# Patient Record
Sex: FEMALE | Race: WHITE | NOT HISPANIC OR LATINO | Employment: FULL TIME | URBAN - METROPOLITAN AREA
[De-identification: names, ages, dates, MRNs, and addresses within clinical notes are randomized per-mention and may not be internally consistent; named-entity substitution may affect disease eponyms.]

---

## 2018-07-03 ENCOUNTER — OFFICE VISIT (OUTPATIENT)
Dept: FAMILY MEDICINE | Facility: CLINIC | Age: 33
End: 2018-07-03
Payer: COMMERCIAL

## 2018-07-03 VITALS
WEIGHT: 150.2 LBS | SYSTOLIC BLOOD PRESSURE: 116 MMHG | HEART RATE: 68 BPM | DIASTOLIC BLOOD PRESSURE: 68 MMHG | RESPIRATION RATE: 16 BRPM | TEMPERATURE: 98.3 F

## 2018-07-03 DIAGNOSIS — R07.0 THROAT PAIN: Primary | ICD-10-CM

## 2018-07-03 DIAGNOSIS — J03.90 TONSILLITIS: ICD-10-CM

## 2018-07-03 LAB
DEPRECATED S PYO AG THROAT QL EIA: NORMAL
SPECIMEN SOURCE: NORMAL

## 2018-07-03 PROCEDURE — 99203 OFFICE O/P NEW LOW 30 MIN: CPT | Performed by: NURSE PRACTITIONER

## 2018-07-03 PROCEDURE — 87880 STREP A ASSAY W/OPTIC: CPT | Performed by: NURSE PRACTITIONER

## 2018-07-03 PROCEDURE — 87081 CULTURE SCREEN ONLY: CPT | Performed by: NURSE PRACTITIONER

## 2018-07-03 RX ORDER — AMOXICILLIN 500 MG/1
500 CAPSULE ORAL 2 TIMES DAILY
Qty: 20 CAPSULE | Refills: 0 | Status: SHIPPED | OUTPATIENT
Start: 2018-07-03 | End: 2018-07-13

## 2018-07-03 NOTE — PATIENT INSTRUCTIONS
Take antibiotic as directed.    Increase rest and fluids. Tylenol and/or Ibuprofen for comfort. Cool mist vaporizer. If your symptoms worsen or do not resolve follow up with your primary care provider in 1 week and sooner if needed.        Indications for emergent return to emergency department discussed with patient, who verbalized good understanding and agreement.  Patient understands the limitations of today's evaluation.           Tonsillitis (Child)    Tonsillitis is an inflammation or infection of your child's tonsils. Your child has 2 tonsils, 1 on either side of his or her throat. The tonsils are large, oval glands. They help prevent infections. But tonsils can become infected themselves. Tonsillitis is a common childhood condition.  Tonsillitis can be caused by bacteria or a virus. The main symptom is a sore throat. Your child may also have a fever, throat redness or swelling, or trouble swallowing. The tonsils may also look white, gray, or yellow.  If your child has a bacterial infection, antibiotics may be prescribed. Antibiotics don t work against viral infections. In some cases of a viral infection, an antiviral medicine may be prescribed. Once the problem has been treated, your child may need surgery to remove the tonsils if they become infected often or cause breathing problems.  Home care  If your child s healthcare provider has prescribed antibiotics or another medicine, give it to your child as directed. Be sure your child finishes all of the medicine, even if he or she feels better.  To help ease your child s sore throat:    Give acetaminophen or ibuprofen. Follow the package instructions for giving these to a child. (Do not give aspirin to anyone younger than 18 years old who is ill with a fever. It may cause severe liver damage.)    Offer cool liquids to drink.    Have your child gargle with warm salt water. An over-the-counter throat-numbing spray may also help.  The germs that cause  tonsillitis are very contagious. To help prevent their spread, follow these tips:    Teach your child to wash his or her hands often.    Don t let your child share cups or utensils with other people.    Keep your child away from other children until he or she is better.  Follow-up care  Follow up with your child's healthcare provider, or as advised.  When to seek medical advice  Unless advised otherwise, call your child's healthcare provider if:    Your child is 3 months old or younger and has a fever of 100.4 F (38 C) or higher. Your child may need to see a healthcare provider.    Your child is of any age and has fevers higher than 104 F (40 C) that come back again and again.  Also call if any of the following occur:    Child has a sore throat for more than 2 days    Child has a sore throat with fever, headache, stomachache, or rash    Child has neck pain    Child has a seizure    Child is acting strangely    Child has trouble swallowing or breathing    Child can t open his or her mouth fully  Date Last Reviewed: 10/1/2017    4619-7965 [employer]. [address] [place] [address]. All rights reserved. This information is not intended as a substitute for professional medical care. Always follow your healthcare professional's instructions.        Self-Care for Sore Throats    Sore throats happen for many reasons, such as colds, allergies, and infections caused by viruses or bacteria. In any case, your throat becomes red and sore. Your goal for self-care is to reduce your discomfort while giving your throat a chance to heal.  Moisten and soothe your throat  Tips include the following:    Try a sip of water first thing after waking up.    Keep your throat moist by drinking 6 or more glasses of clear liquids every day.    Run a cool-air humidifier in your room overnight.    Avoid cigarette smoke.     Suck on throat lozenges, cough drops, hard candy, ice chips, or frozen fruit-juice bars. Use the  sugar-free versions if your diet or medical condition requires them.  Gargle to ease irritation  Gargling every hour or 2 can ease irritation. Try gargling with 1 of these solutions:    1/4 teaspoon of salt in 1/2 cup of warm water    An over-the-counter anesthetic gargle  Use medicine for more relief  Over-the-counter medicine can reduce sore throat symptoms. Ask your pharmacist if you have questions about which medicine to use:    Ease pain with anesthetic sprays. Aspirin or an aspirin substitute also helps. Remember, never give aspirin to anyone 18 or younger, or if you are already taking blood thinners.     For sore throats caused by allergies, try antihistamines to block the allergic reaction.    Remember: unless a sore throat is caused by a bacterial infection, antibiotics won t help you.  Prevent future sore throats  Prevention tips include the following:    Stop smoking or reduce contact with secondhand smoke. Smoke irritates the tender throat lining.    Limit contact with pets and with allergy-causing substances, such as pollen and mold.    When you re around someone with a sore throat or cold, wash your hands often to keep viruses or bacteria from spreading.    Don t strain your vocal cords.  Call your healthcare provider  Contact your healthcare provider if you have:    A temperature over 101 F (38.3 C)    White spots on the throat    Great difficulty swallowing    Trouble breathing    A skin rash    Recent exposure to someone else with strep bacteria    Severe hoarseness and swollen glands in the neck or jaw   Date Last Reviewed: 8/1/2016 2000-2017 [employer]. [address] [place] [address], [place] [address]. All rights reserved. This information is not intended as a substitute for professional medical care. Always follow your healthcare professional's instructions.

## 2018-07-03 NOTE — NURSING NOTE
Chief Complaint   Patient presents with     Pharyngitis       Initial /68 (BP Location: Right arm, Cuff Size: Adult Regular)  Pulse 68  Temp 98.3  F (36.8  C) (Tympanic)  Resp 16  Wt 150 lb 3.2 oz (68.1 kg) There is no height or weight on file to calculate BMI.      Health Maintenance that is potentially due pending provider review:  NONE    n/a    Is there anyone who you would like to be able to receive your results? Not Applicable  If yes have patient fill out LEANDRO  Inga Smith M.A.

## 2018-07-03 NOTE — LETTER
July 5, 2018      Omayra Cadena  [address] [facility] [address]        Dear Omayra,         This letter is to inform you that the results of your recent throat culture are negative.  If you have any questions please call or make an appointment.          Sincerely,        ANIBAL Ayala CNP

## 2018-07-03 NOTE — PROGRESS NOTES
SUBJECTIVE:   Omayra Cadena is a 33 year old female who presents to clinic today for the following health issues:    ENT Symptoms             Symptoms: cc Present Absent Comment   Fever/Chills       Fatigue       Muscle Aches       Eye Irritation       Sneezing       Nasal Ethan/Drg       Sinus Pressure/Pain       Loss of smell       Dental pain       Sore Throat  x     Swollen Glands  x  Cold makes it burn. Hx of tonsil stones    Ear Pain/Fullness       Cough       Wheeze       Chest Pain       Shortness of breath       Rash       Other         Symptom duration:  Friday    Symptom severity:  worse since Sunday    Treatments tried:  cough drops no help   Contacts:   None              Problem list and histories reviewed & adjusted, as indicated.  Additional history: as documented    There is no problem list on file for this patient.    History reviewed. No pertinent surgical history.    Social History   Substance Use Topics     Smoking status: Never Smoker     Smokeless tobacco: Never Used     Alcohol use Not on file     History reviewed. No pertinent family history.      Current Outpatient Prescriptions   Medication Sig Dispense Refill     amoxicillin (AMOXIL) 500 MG capsule Take 1 capsule (500 mg) by mouth 2 times daily for 10 days 20 capsule 0     Not on File  Labs reviewed in EPIC    Reviewed and updated as needed this visit by clinical staff  Tobacco  Allergies  Meds  Problems  Med Hx  Surg Hx  Fam Hx  Soc Hx        Reviewed and updated as needed this visit by Provider  Allergies  Meds  Problems         ROS:  Constitutional, HEENT, cardiovascular, pulmonary, GI, , musculoskeletal, neuro, skin, endocrine and psych systems are negative, except as otherwise noted.    OBJECTIVE:     /68 (BP Location: Right arm, Cuff Size: Adult Regular)  Pulse 68  Temp 98.3  F (36.8  C) (Tympanic)  Resp 16  Wt 150 lb 3.2 oz (68.1 kg)  There is no height or weight on file to calculate BMI.   GENERAL:  healthy, alert and no distress, nontoxic in appearance  EYES: Eyes grossly normal to inspection, PERRL and conjunctivae and sclerae normal  HENT: ear canals and TM's normal, nose and mouth without ulcers or lesions, posterior oropharynx red on both sides without exudate but her pain is more on right side.  NECK: no adenopathy, supple with full ROM  RESP: lungs clear to auscultation - no rales, rhonchi or wheezes  CV: regular rate and rhythm, normal S1 S2, no S3 or S4, no murmur, click or rub, no peripheral edema   ABDOMEN: soft, nontender, no hepatosplenomegaly, no masses and bowel sounds normal  MS: no gross musculoskeletal defects noted, no edema  No rash    Diagnostic Test Results:  Results for orders placed or performed in visit on 07/03/18 (from the past 24 hour(s))   Strep, Rapid Screen   Result Value Ref Range    Specimen Description Throat     Rapid Strep A Screen       NEGATIVE: No Group A streptococcal antigen detected by immunoassay, await culture report.       ASSESSMENT/PLAN:   Pt states she feels like a knife is stabbing her in the right tonsil. She has had stones in the past. Will try emperic treatment with amoxicillin and if no response or worse she needs to follow up with PCP or go to the ER.  Problem List Items Addressed This Visit     None      Visit Diagnoses     Throat pain    -  Primary    Relevant Orders    Strep, Rapid Screen (Completed)    Beta strep group A culture (Completed)    Tonsillitis        Relevant Medications    amoxicillin (AMOXIL) 500 MG capsule         Only tonsillitis instructions are for child. Pt told prior to discharge.      Patient Instructions   Take antibiotic as directed.    Increase rest and fluids. Tylenol and/or Ibuprofen for comfort. Cool mist vaporizer. If your symptoms worsen or do not resolve follow up with your primary care provider in 1 week and sooner if needed.        Indications for emergent return to emergency department discussed with patient, who  verbalized good understanding and agreement.  Patient understands the limitations of today's evaluation.           Tonsillitis (Child)    Tonsillitis is an inflammation or infection of your child's tonsils. Your child has 2 tonsils, 1 on either side of his or her throat. The tonsils are large, oval glands. They help prevent infections. But tonsils can become infected themselves. Tonsillitis is a common childhood condition.  Tonsillitis can be caused by bacteria or a virus. The main symptom is a sore throat. Your child may also have a fever, throat redness or swelling, or trouble swallowing. The tonsils may also look white, gray, or yellow.  If your child has a bacterial infection, antibiotics may be prescribed. Antibiotics don t work against viral infections. In some cases of a viral infection, an antiviral medicine may be prescribed. Once the problem has been treated, your child may need surgery to remove the tonsils if they become infected often or cause breathing problems.  Home care  If your child s healthcare provider has prescribed antibiotics or another medicine, give it to your child as directed. Be sure your child finishes all of the medicine, even if he or she feels better.  To help ease your child s sore throat:    Give acetaminophen or ibuprofen. Follow the package instructions for giving these to a child. (Do not give aspirin to anyone younger than 18 years old who is ill with a fever. It may cause severe liver damage.)    Offer cool liquids to drink.    Have your child gargle with warm salt water. An over-the-counter throat-numbing spray may also help.  The germs that cause tonsillitis are very contagious. To help prevent their spread, follow these tips:    Teach your child to wash his or her hands often.    Don t let your child share cups or utensils with other people.    Keep your child away from other children until he or she is better.  Follow-up care  Follow up with your child's healthcare  provider, or as advised.  When to seek medical advice  Unless advised otherwise, call your child's healthcare provider if:    Your child is 3 months old or younger and has a fever of 100.4 F (38 C) or higher. Your child may need to see a healthcare provider.    Your child is of any age and has fevers higher than 104 F (40 C) that come back again and again.  Also call if any of the following occur:    Child has a sore throat for more than 2 days    Child has a sore throat with fever, headache, stomachache, or rash    Child has neck pain    Child has a seizure    Child is acting strangely    Child has trouble swallowing or breathing    Child can t open his or her mouth fully  Date Last Reviewed: 10/1/2017    9280-3595 [employer]. [address] [place] [address], [place] [address]. All rights reserved. This information is not intended as a substitute for professional medical care. Always follow your healthcare professional's instructions.            FLNB SAME DAY PROVIDER  [facility]

## 2018-07-03 NOTE — MR AVS SNAPSHOT
After Visit Summary   7/3/2018    Omayra Cadena    MRN: [id number]           Patient Information     Date Of Birth          1985        Visit Information        Provider Department      7/3/2018 1:00 PM Anne-Marie Sadler APRN [facility]        Today's Diagnoses     Throat pain    -  1    Tonsillitis          Care Instructions    Take antibiotic as directed.    Increase rest and fluids. Tylenol and/or Ibuprofen for comfort. Cool mist vaporizer. If your symptoms worsen or do not resolve follow up with your primary care provider in 1 week and sooner if needed.        Indications for emergent return to emergency department discussed with patient, who verbalized good understanding and agreement.  Patient understands the limitations of today's evaluation.           Tonsillitis (Child)    Tonsillitis is an inflammation or infection of your child's tonsils. Your child has 2 tonsils, 1 on either side of his or her throat. The tonsils are large, oval glands. They help prevent infections. But tonsils can become infected themselves. Tonsillitis is a common childhood condition.  Tonsillitis can be caused by bacteria or a virus. The main symptom is a sore throat. Your child may also have a fever, throat redness or swelling, or trouble swallowing. The tonsils may also look white, gray, or yellow.  If your child has a bacterial infection, antibiotics may be prescribed. Antibiotics don t work against viral infections. In some cases of a viral infection, an antiviral medicine may be prescribed. Once the problem has been treated, your child may need surgery to remove the tonsils if they become infected often or cause breathing problems.  Home care  If your child s healthcare provider has prescribed antibiotics or another medicine, give it to your child as directed. Be sure your child finishes all of the medicine, even if he or she feels better.  To help ease your child s sore  throat:    Give acetaminophen or ibuprofen. Follow the package instructions for giving these to a child. (Do not give aspirin to anyone younger than 18 years old who is ill with a fever. It may cause severe liver damage.)    Offer cool liquids to drink.    Have your child gargle with warm salt water. An over-the-counter throat-numbing spray may also help.  The germs that cause tonsillitis are very contagious. To help prevent their spread, follow these tips:    Teach your child to wash his or her hands often.    Don t let your child share cups or utensils with other people.    Keep your child away from other children until he or she is better.  Follow-up care  Follow up with your child's healthcare provider, or as advised.  When to seek medical advice  Unless advised otherwise, call your child's healthcare provider if:    Your child is 3 months old or younger and has a fever of 100.4 F (38 C) or higher. Your child may need to see a healthcare provider.    Your child is of any age and has fevers higher than 104 F (40 C) that come back again and again.  Also call if any of the following occur:    Child has a sore throat for more than 2 days    Child has a sore throat with fever, headache, stomachache, or rash    Child has neck pain    Child has a seizure    Child is acting strangely    Child has trouble swallowing or breathing    Child can t open his or her mouth fully  Date Last Reviewed: 10/1/2017    9370-6381 [employer]. [address] [place] [address]. All rights reserved. This information is not intended as a substitute for professional medical care. Always follow your healthcare professional's instructions.                Follow-ups after your visit        Follow-up notes from your care team     See patient instructions section of the AVS Return if symptoms worsen or fail to improve, for Follow up with your primary care provider.      Who to contact     If you have questions or need  "follow up information about today's clinic visit or your schedule please contact [facility] directly at [phone].  Normal or non-critical lab and imaging results will be communicated to you by [employer], letter or phone within 4 business days after the clinic has received the results. If you do not hear from us within 7 days, please contact the clinic through [employer] or phone. If you have a critical or abnormal lab result, we will notify you by phone as soon as possible.  Submit refill requests through [employer] or call your pharmacy and they will forward the refill request to us. Please allow 3 business days for your refill to be completed.          Additional Information About Your Visit        [employer] Information     [employer] lets you send messages to your doctor, view your test results, renew your prescriptions, schedule appointments and more. To sign up, go to [email or web address] . Click on \"Log in\" on the left side of the screen, which will take you to the Welcome page. Then click on \"Sign up Now\" on the right side of the page.     You will be asked to enter the access code listed below, as well as some personal information. Please follow the directions to create your username and password.     Your access code is: [email or web address]  Expires: 10/1/2018 12:46 PM     Your access code will  in 90 days. If you need help or a new code, please call your [facility] clinic or [phone].        Care EveryWhere ID     This is your Care EveryWhere ID. This could be used by other organizations to access your [facility] medical records  [id number]        Your Vitals Were     Pulse Temperature Respirations             68 98.3  F (36.8  C) (Tympanic) 16          Blood Pressure from Last 3 Encounters:   18 116/68    Weight from Last 3 Encounters:   18 150 lb 3.2 oz (68.1 kg)              We Performed the Following     Beta strep group A culture     Strep, Rapid Screen          Today's " Medication Changes          These changes are accurate as of 7/3/18  2:22 PM.  If you have any questions, ask your nurse or doctor.               Start taking these medicines.        Dose/Directions    amoxicillin 500 MG capsule   Commonly known as:  AMOXIL   Used for:  Tonsillitis   Started by:  Anne-Marie Sadler APRN CNP        Dose:  500 mg   Take 1 capsule (500 mg) by mouth 2 times daily for 10 days   Quantity:  20 capsule   Refills:  0            Where to get your medicines      These medications were sent to [facility] [address] [address]     Phone:  [phone]     amoxicillin 500 MG capsule                Primary Care Provider Fax #    Physician No Ref-Primary [phone]       No address on file        Equal Access to Services     ANGEL DON : Evens Logan, waaxda luqadaha, qaybta kaalmada adeegyada, palmira walker. So [facility] [phone].    ATENCIÓN: Si habla español, tiene a harden disposición servicios gratuitos de asistencia lingüística. [facility] [phone].    We comply with applicable federal civil rights laws and [place] laws. We do not discriminate on the basis of race, color, national origin, age, disability, sex, sexual orientation, or gender identity.            Thank you!     Thank you for choosing [facility]  for your care. Our goal is always to provide you with excellent care. Hearing back from our patients is one way we can continue to improve our services. Please take a few minutes to complete the written survey that you may receive in the mail after your visit with us. Thank you!             Your Updated Medication List - Protect others around you: Learn how to safely use, store and throw away your medicines at www.disposemymeds.org.          This list is accurate as of 7/3/18  2:22 PM.  Always use your most recent med list.                    Brand Name Dispense Instructions for use Diagnosis    amoxicillin 500 MG capsule    AMOXIL    20 capsule    Take 1 capsule (500 mg) by mouth 2 times daily for 10 days    Tonsillitis

## 2018-07-04 LAB
BACTERIA SPEC CULT: NORMAL
SPECIMEN SOURCE: NORMAL

## 2018-12-05 ENCOUNTER — OFFICE VISIT (OUTPATIENT)
Dept: FAMILY MEDICINE | Facility: CLINIC | Age: 33
End: 2018-12-05
Payer: COMMERCIAL

## 2018-12-05 VITALS
TEMPERATURE: 97.8 F | RESPIRATION RATE: 18 BRPM | HEART RATE: 72 BPM | DIASTOLIC BLOOD PRESSURE: 70 MMHG | SYSTOLIC BLOOD PRESSURE: 124 MMHG | HEIGHT: 63 IN | WEIGHT: 156 LBS | BODY MASS INDEX: 27.64 KG/M2

## 2018-12-05 DIAGNOSIS — G44.209 TENSION HEADACHE: ICD-10-CM

## 2018-12-05 DIAGNOSIS — Z00.00 ANNUAL PHYSICAL EXAM: Primary | ICD-10-CM

## 2018-12-05 DIAGNOSIS — G43.011 INTRACTABLE MIGRAINE WITHOUT AURA AND WITH STATUS MIGRAINOSUS: ICD-10-CM

## 2018-12-05 DIAGNOSIS — F43.23 ADJUSTMENT DISORDER WITH MIXED ANXIETY AND DEPRESSED MOOD: ICD-10-CM

## 2018-12-05 PROCEDURE — G0145 SCR C/V CYTO,THINLAYER,RESCR: HCPCS | Performed by: NURSE PRACTITIONER

## 2018-12-05 PROCEDURE — 87624 HPV HI-RISK TYP POOLED RSLT: CPT | Performed by: NURSE PRACTITIONER

## 2018-12-05 PROCEDURE — 99213 OFFICE O/P EST LOW 20 MIN: CPT | Mod: 25 | Performed by: NURSE PRACTITIONER

## 2018-12-05 PROCEDURE — 99395 PREV VISIT EST AGE 18-39: CPT | Performed by: NURSE PRACTITIONER

## 2018-12-05 RX ORDER — CYCLOBENZAPRINE HCL 10 MG
5-10 TABLET ORAL 3 TIMES DAILY PRN
Qty: 30 TABLET | Refills: 1 | Status: SHIPPED | OUTPATIENT
Start: 2018-12-05 | End: 2019-01-23

## 2018-12-05 ASSESSMENT — ENCOUNTER SYMPTOMS
HEADACHES: 1
DYSURIA: 0
ABDOMINAL PAIN: 0
HEMATOCHEZIA: 0
JOINT SWELLING: 0
MYALGIAS: 0
CHILLS: 0
DIARRHEA: 0
ARTHRALGIAS: 0
HEARTBURN: 0
PALPITATIONS: 0
EYE PAIN: 1
NAUSEA: 0
DIZZINESS: 1
BREAST MASS: 0
SORE THROAT: 0
COUGH: 0
FREQUENCY: 0
CONSTIPATION: 1
HEMATURIA: 0
NERVOUS/ANXIOUS: 1
PARESTHESIAS: 0
FEVER: 0
SHORTNESS OF BREATH: 0
WEAKNESS: 0

## 2018-12-05 ASSESSMENT — ANXIETY QUESTIONNAIRES
GAD7 TOTAL SCORE: 16
2. NOT BEING ABLE TO STOP OR CONTROL WORRYING: NEARLY EVERY DAY
4. TROUBLE RELAXING: MORE THAN HALF THE DAYS
GAD7 TOTAL SCORE: 16
7. FEELING AFRAID AS IF SOMETHING AWFUL MIGHT HAPPEN: NOT AT ALL
5. BEING SO RESTLESS THAT IT IS HARD TO SIT STILL: MORE THAN HALF THE DAYS
3. WORRYING TOO MUCH ABOUT DIFFERENT THINGS: NEARLY EVERY DAY
1. FEELING NERVOUS, ANXIOUS, OR ON EDGE: NEARLY EVERY DAY
7. FEELING AFRAID AS IF SOMETHING AWFUL MIGHT HAPPEN: NOT AT ALL
GAD7 TOTAL SCORE: 16
6. BECOMING EASILY ANNOYED OR IRRITABLE: NEARLY EVERY DAY

## 2018-12-05 ASSESSMENT — PATIENT HEALTH QUESTIONNAIRE - PHQ9
SUM OF ALL RESPONSES TO PHQ QUESTIONS 1-9: 11
SUM OF ALL RESPONSES TO PHQ QUESTIONS 1-9: 11
10. IF YOU CHECKED OFF ANY PROBLEMS, HOW DIFFICULT HAVE THESE PROBLEMS MADE IT FOR YOU TO DO YOUR WORK, TAKE CARE OF THINGS AT HOME, OR GET ALONG WITH OTHER PEOPLE: SOMEWHAT DIFFICULT

## 2018-12-05 NOTE — LETTER
My Migraine Action Plan      Date: 1/7/2019     My Name: Omayra Cadena   YOB: 1985  My Pharmacy: Data Unavailable       My (Preventative) Control Medicine: NA        My Rescue Medicine: NA   My Doctor: Krysta Webster     My Clinic: [facility]  [address] [place] [id number]  [phone]        GREEN ZONE = Good Control    My headache plan is working.   I can do what I need to do.           I WILL:     ? Keep managing my triggers.  ? Write in my migraine diary each time I have a headache.  ? Keep taking my preventive (controller) medicine daily.  ? Take my relief and rescue medicine as needed.             YELLOW ZONE = Not Enough Control    My headache plan isn t always working.   My headaches keep me from doing   some of the things I need to do.       I WILL:     ? Set goals to control my triggers and act on them.  ? Write in my migraine diary each time I have a headache and review it for                      patterns or new triggers.  ? Keep taking my preventive (controller) medicine daily.  ? Take my relief and rescue medicine as needed.  ? Call my doctor or clinic at if I stay in the Yellow Zone.             RED ZONE = Poor or No Control    My headache plan has  failed. I can t do anything  when I have one. My  medicines aren t working.           I WILL:   ? Set goals to control my triggers and act on them.  ? Write in my migraine diary each time I have a headache and review it for                      patterns or new triggers.  ? Keep taking my preventive (controller) medicine daily.  ? Take my relief and rescue medicine as needed.  ? Call my doctor or clinic or go to urgent care or an ER if I m having the worst                  headache of my life.  ? Call my doctor or clinic or go to urgent care or an ER if my medicine doesn t work.  ? Let my doctor or clinic know within 2 weeks if I have gone to an urgent care or             emergency department.           Provider specific instructions:

## 2018-12-05 NOTE — PROGRESS NOTES
SUBJECTIVE:   CC: Omayra Cadena is an 33 year old woman who presents for preventive health visit.     Physical   Annual:     Getting at least 3 servings of Calcium per day:  Yes    Bi-annual eye exam:  NO    Dental care twice a year:  Yes    Sleep apnea or symptoms of sleep apnea:  None    Diet:  Regular (no restrictions)    Frequency of exercise:  None    Taking medications regularly:  Yes    Medication side effects:  None    Additional concerns today:  Yes    PHQ-2 Total Score: 1      Patient would like to discuss depression and anxiety symptoms. She states that she has tried several different medications and nothing has worked in the past.     Today's PHQ-2 Score:   PHQ-2 ( 1999 [employer]) 12/5/2018   Q1: Little interest or pleasure in doing things 1   Q2: Feeling down, depressed or hopeless 0   PHQ-2 Score 1   Q1: Little interest or pleasure in doing things Several days   Q2: Feeling down, depressed or hopeless Not at all   PHQ-2 Score 1     Answers for HPI/ROS submitted by the patient on 12/5/2018   If you checked off any problems, how difficult have these problems made it for you to do your work, take care of things at home, or get along with other people?: Somewhat difficult  PHQ9 TOTAL SCORE: 11  ABDI 7 TOTAL SCORE: 16    Abuse: Current or Past(Physical, Sexual or Emotional)- No  Do you feel safe in your environment? Yes    Social History   Substance Use Topics     Smoking status: Never Smoker     Smokeless tobacco: Never Used     Alcohol use Not on file     Alcohol Use 12/5/2018   If you drink alcohol do you typically have greater than 3 drinks per day OR greater than 7 drinks per week? No   No flowsheet data found.    Reviewed orders with patient.  Reviewed health maintenance and updated orders accordingly - Yes  BP Readings from Last 3 Encounters:   12/05/18 124/70   07/03/18 116/68    Wt Readings from Last 3 Encounters:   12/05/18 156 lb (70.8 kg)   07/03/18 150 lb 3.2 oz (68.1 kg)                   There is no problem list on file for this patient.    History reviewed. No pertinent surgical history.    Social History   Substance Use Topics     Smoking status: Former Smoker     Smokeless tobacco: Never Used     Alcohol use No     History reviewed. No pertinent family history.      No current outpatient prescriptions on file.     No Known Allergies  No lab results found.     Mammogram not appropriate for this patient based on age.    Pertinent mammograms are reviewed under the imaging tab.  History of abnormal Pap smear: NO - age 30- 65 PAP every 3 years recommended     Reviewed and updated as needed this visit by clinical staff         Reviewed and updated as needed this visit by Provider        History reviewed. No pertinent past medical history.   History reviewed. No pertinent surgical history.  Obstetric History     No data available          Review of Systems   Constitutional: Negative for chills and fever.   HENT: Negative for congestion, ear pain, hearing loss and sore throat.    Eyes: Positive for pain. Negative for visual disturbance.   Respiratory: Negative for cough and shortness of breath.    Cardiovascular: Negative for chest pain, palpitations and peripheral edema.   Gastrointestinal: Positive for constipation. Negative for abdominal pain, diarrhea, heartburn, hematochezia and nausea.   Breasts:  Negative for tenderness, breast mass and discharge.   Genitourinary: Negative for dysuria, frequency, genital sores, hematuria, pelvic pain, urgency, vaginal bleeding and vaginal discharge.   Musculoskeletal: Negative for arthralgias, joint swelling and myalgias.   Skin: Negative for rash.   Neurological: Positive for dizziness and headaches. Negative for weakness and paresthesias.   Psychiatric/Behavioral: Positive for mood changes. The patient is nervous/anxious.           OBJECTIVE:   /70 (BP Location: Right arm, Cuff Size: Adult Regular)  Pulse 72  Temp 97.8  F (36.6  C) (Tympanic)  Resp 18  " Ht 5' 3\" (1.6 m)  Wt 156 lb (70.8 kg)  LMP 12/04/2018 (Exact Date)  BMI 27.63 kg/m2  Physical Exam  GENERAL: healthy, alert and no distress  EYES: Eyes grossly normal to inspection, PERRL and conjunctivae and sclerae normal  HENT: ear canals and TM's normal, nose and mouth without ulcers or lesions  NECK: no adenopathy, no asymmetry, masses, or scars and thyroid normal to palpation  RESP: lungs clear to auscultation - no rales, rhonchi or wheezes  CV: regular rate and rhythm, normal S1 S2, no S3 or S4, no murmur, click or rub, no peripheral edema and peripheral pulses strong  ABDOMEN: soft, nontender, no hepatosplenomegaly, no masses and bowel sounds normal  MS: no gross musculoskeletal defects noted, no edema  SKIN: no suspicious lesions or rashes  NEURO: Normal strength and tone, mentation intact and speech normal  PSYCH: mentation appears normal, affect normal/bright      ASSESSMENT/PLAN:   (Z00.00) Annual physical exam  (primary encounter diagnosis)  Comment:   Plan: Pap imaged thin layer screen with HPV -         recommended age 30 - 65 years (select HPV order        below), HPV High Risk Types DNA Cervical      (G43.011) Intractable migraine without aura and with status migrainosus  Comment: Patient has history of migraines not presently on medication patient would like to have a referral to neurology for further evaluation.  Patient also states she has been on various medications that would not been helpful again unable to access her records.  Prior to her neurology appointment patient will get the list of medications that she is been on from her prior clinic.  Patient not interested in starting Imitrex and she said that not been helpful in the past patient not interested in starting a preventative medication that she states she has been on various medications.  Return to headaches may be more tension type will start with Flexeril for treatment to see if that helps.  Plan: NEUROLOGY ADULT REFERRAL, " OFFICE/OUTPT         VISIT,KRISTIN KAUFMAN III      (F43.23) Adjustment disorder with mixed anxiety and depressed mood  Comment: She states that she has tried several different medications and nothing has worked in the past.  Patient has not been seen within the [facility] system or [facility] system does not have a current medical record patient is not certain of all the medications that she is trying the patches that has been over 5 different medications.  Patient states she is not into starting a medication today she would like to have a neuropsych evaluation to check in responsiveness to medications and would like to see about their recommendations for medication management.  Feel she is not been properly diagnosed as nothing has been working.  Did put in referral for mental health per patient's request for further evaluation and medication management    Plan: MENTAL HEALTH REFERRAL  - Adult; Psychiatry and        Medication Management; [place] [facility] [place]         [facility] [phone].  Medication management & future         refills will be returned to [facility] PCP upon         completion of evaluation; We deidre...,         OFFICE/OUTPT VISIT,KRISTIN KAUFMAN III            (G44.209) Tension headache  Comment: Patient has history of migraine headaches and tension headaches patient will try Flexeril for her headaches to see if that is improving if not she would consider follow-up with neurology  Plan: cyclobenzaprine (FLEXERIL) 10 MG tablet,         OFFICE/OUTPT VISIT,KRISTIN KAUFMAN III      COUNSELING:  Reviewed preventive health counseling, as reflected in patient instructions       Regular exercise       Healthy diet/nutrition       Vision screening       Hearing screening       Contraception       Family planning       Folic Acid Counseling       Safe sex practices/STD prevention    BP Readings from Last 1 Encounters:   07/03/18 116/68     Estimated body mass index is 27.63 kg/m  as calculated from the  "following:    Height as of this encounter: 1.6 m (5' 3\").    Weight as of this encounter: 70.8 kg (156 lb).           reports that she has never smoked. She has never used smokeless tobacco.      Counseling Resources:  ATP IV Guidelines  Pooled Cohorts Equation Calculator  Breast Cancer Risk Calculator  FRAX Risk Assessment  ICSI Preventive Guidelines  Dietary Guidelines for Americans, 2010  USDA's MyPlate  ASA Prophylaxis  Lung CA Screening    ANIBAL Hancock CNP  [facility]  "

## 2018-12-05 NOTE — LETTER
My Depression Action Plan  Name: Omayra Cadena   Date of Birth 1985  Date: 1/7/2019    My doctor: Krysta Webster   My clinic: [address] [id number] [phone]          GREEN    ZONE   Good Control    What it looks like:     Things are going generally well. You have normal up s and down s. You may even feel depressed from time to time, but bad moods usually last less than a day.   What you need to do:  1. Continue to care for yourself (see self care plan)  2. Check your depression survival kit and update it as needed  3. Follow your physician s recommendations including any medication.  4. Do not stop taking medication unless you consult with your physician first.           YELLOW         ZONE Getting Worse    What it looks like:     Depression is starting to interfere with your life.     It may be hard to get out of bed; you may be starting to isolate yourself from others.    Symptoms of depression are starting to last most all day and this has happened for several days.     You may have suicidal thoughts but they are not constant.   What you need to do:     1. Call your care team, your response to treatment will improve if you keep your care team informed of your progress. Yellow periods are signs an adjustment may need to be made.     2. Continue your self-care, even if you have to fake it!    3. Talk to someone in your support network    4. Open up your depression survival kit           RED    ZONE Medical Alert - Get Help    What it looks like:     Depression is seriously interfering with your life.     You may experience these or other symptoms: You can t get out of bed most days, can t work or engage in other necessary activities, you have trouble taking care of basic hygiene, or basic responsibilities, thoughts of suicide or death that will not go away, self-injurious behavior.     What you need to do:  1. Call your care team  and request a same-day appointment. If they are not available (weekends or after hours) call your local crisis line, emergency room or 911.            Depression Self Care Plan / Survival Kit    Self-Care for Depression  Here s the deal. Your body and mind are really not as separate as most people think.  What you do and think affects how you feel and how you feel influences what you do and think. This means if you do things that people who feel good do, it will help you feel better.  Sometimes this is all it takes.  There is also a place for medication and therapy depending on how severe your depression is, so be sure to consult with your medical provider and/ or Behavioral Health Consultant if your symptoms are worsening or not improving.     In order to better manage my stress, I will:    Exercise  Get some form of exercise, every day. This will help reduce pain and release endorphins, the  feel good  chemicals in your brain. This is almost as good as taking antidepressants!  This is not the same as joining a gym and then never going! (they count on that by the way ) It can be as simple as just going for a walk or doing some gardening, anything that will get you moving.      Hygiene   Maintain good hygiene (Get out of bed in the morning, Make your bed, Brush your teeth, Take a shower, and Get dressed like you were going to work, even if you are unemployed).  If your clothes don't fit try to get ones that do.    Diet  I will strive to eat foods that are good for me, drink plenty of water, and avoid excessive sugar, caffeine, alcohol, and other mood-altering substances.  Some foods that are helpful in depression are: complex carbohydrates, B vitamins, flaxseed, fish or fish oil, fresh fruits and vegetables.    Psychotherapy  I agree to participate in Individual Therapy (if recommended).    Medication  If prescribed medications, I agree to take them.  Missing doses can result in serious side effects.  I understand  that drinking alcohol, or other illicit drug use, may cause potential side effects.  I will not stop my medication abruptly without first discussing it with my provider.    Staying Connected With Others  I will stay in touch with my friends, family members, and my primary care provider/team.    Use your imagination  Be creative.  We all have a creative side; it doesn t matter if it s oil painting, sand castles, or mud pies! This will also kick up the endorphins.    Witness Beauty  (AKA stop and smell the roses) Take a look outside, even in mid-winter. Notice colors, textures. Watch the squirrels and birds.     Service to others  Be of service to others.  There is always someone else in need.  By helping others we can  get out of ourselves  and remember the really important things.  This also provides opportunities for practicing all the other parts of the program.    Humor  Laugh and be silly!  Adjust your TV habits for less news and crime-drama and more comedy.    Control your stress  Try breathing deep, massage therapy, biofeedback, and meditation. Find time to relax each day.     My support system    Clinic Contact:  Phone number:    Contact 1:  Phone number:    Contact 2:  Phone number:    [employer]/:  Phone number:    Therapist:  Phone number:    Local crisis center:    Phone number:    Other community support:  Phone number:

## 2018-12-05 NOTE — NURSING NOTE
"Chief Complaint   Patient presents with     Physical       Initial /70 (BP Location: Right arm, Cuff Size: Adult Regular)  Pulse 72  Temp 97.8  F (36.6  C) (Tympanic)  Resp 18  Ht 5' 3\" (1.6 m)  Wt 156 lb (70.8 kg)  LMP 12/04/2018 (Exact Date)  BMI 27.63 kg/m2 Estimated body mass index is 27.63 kg/(m^2) as calculated from the following:    Height as of this encounter: 5' 3\" (1.6 m).    Weight as of this encounter: 156 lb (70.8 kg).    Patient presents to the clinic using No DME    Health Maintenance that is potentially due pending provider review:  Pap Smear    Possibly completing today per provider review- patient started her period yesterday, not sure if we can do pap today.    Is there anyone who you would like to be able to receive your results? No  If yes have patient fill out LEANDRO      "

## 2018-12-05 NOTE — MR AVS SNAPSHOT
After Visit Summary   12/5/2018    Omayra Cadena    MRN: [id number]           Patient Information     Date Of Birth          1985        Visit Information        Provider Department      12/5/2018 3:00 PM Krysta Webster APRN CNP [facility]        Today's Diagnoses     Annual physical exam    -  1    Intractable migraine without aura and with status migrainosus        Adjustment disorder with mixed anxiety and depressed mood        Tension headache          Care Instructions    Your provider has referred you for the following:   Consult at [facility]: [facility] [phone]   [email or web address]    Preventive Health Recommendations  Female Ages 26 - 39  Yearly exam:   See your health care provider every year in order to    Review health changes.     Discuss preventive care.      Review your medicines if you your doctor has prescribed any.    Until age 30: Get a Pap test every three years (more often if you have had an abnormal result).    After age 30: Talk to your doctor about whether you should have a Pap test every 3 years or have a Pap test with HPV screening every 5 years.   You do not need a Pap test if your uterus was removed (hysterectomy) and you have not had cancer.  You should be tested each year for STDs (sexually transmitted diseases), if you're at risk.   Talk to your provider about how often to have your cholesterol checked.  If you are at risk for diabetes, you should have a diabetes test (fasting glucose).  Shots: Get a flu shot each year. Get a tetanus shot every 10 years.   Nutrition:     Eat at least 5 servings of fruits and vegetables each day.    Eat whole-grain bread, whole-wheat pasta and brown rice instead of white grains and rice.    Get adequate Calcium and Vitamin D.     Lifestyle    Exercise at least 150 minutes a week (30 minutes a day, 5 days of the week). This will help you control your weight and  prevent disease.    Limit alcohol to one drink per day.    No smoking.     Wear sunscreen to prevent skin cancer.    See your dentist every six months for an exam and cleaning.    Adjustment Disorder  Life changes--work, family, parents, children--each can cause a great deal of stress in life. An adjustment disorder means you have trouble dealing with this change and stress. This problem can have serious results. You may feel helpless, depressed, make bad decisions, or even feel like you want to hurt yourself.  Adjustment disorder can cause anxiety or depression. It is triggered by daily stresses such as:    Death of a loved one    Divorce    Marriage    General life changes such as changing or leaving a job    Moving    Illness or other health issue for you or a family member    Sex    Money     Symptoms may include:    Sadness or crying    Anxiety    Insomnia    Poor concentration    Trouble doing simple things    New problems at work or with family or friends    Loss of self-esteem    Sense of hopelessness    Feeling trapped or cut off from others  With this condition, it is common to feel sad, guilty, hopeless, and restless. These feelings may continue for weeks or months. It can be helpful to identify what is causing the additional stress and take steps to get extra support. If new stressful events do not happen, it is likely that you will gradually start feeling better.  Home care    If you have been given a prescription for medicine, take it as directed.    It helps to talk about your feelings and thoughts with family or friends who understand and support you.  Follow-up care  Follow up with your healthcare provider, or therapist as advised. Let them know if this condition does not improve or gets worse.  When to seek medical advice  Call your healthcare provider right away if any of these happen:    Worsening depression or anxiety    Feeling out of control    Thoughts of harming yourself or another    Being  unable to care for yourself  Date Last Reviewed: 10/1/2017    8342-0991 [employer]. [address] [place] [address], [place] [address]. All rights reserved. This information is not intended as a substitute for professional medical care. Always follow your healthcare professional's instructions.        What Are Migraine and Tension Headaches?    Although there are several types of headaches, migraine and tension headaches affect the most people. When you have a headache, it isn't your brain that's hurting. Your head aches because nerves in the bones, blood vessels, meninges, and muscles of your head are irritated. These irritated nerves send pain signals to the brain, which identifies where you hurt and how bad the pain is.  Talk with your healthcare provider about a treatment plan that may help relieve pain and prevent future headaches.   What causes your headache?  The actual headache process is not yet understood. Only rarely are headaches a sign of a serious medical problem such as a tumor. Headache pain may be caused by abnormal interaction between the brain and the nerves and blood vessels in the head. A previous head injury or concussion, neck pain, environmental stresses, muscle tension, anxiety, depression, fatigue, skipping meals, or certain foods and drinks may trigger headache pain.  Brain scans are rarely needed and only for certain danger sign symptoms. CT scans are associated with potential radiation effects and potential inaccurate false findings.  What is referred pain?  Headache pain can be referred pain, which is pain that has its source in one place but is felt in another. For example, pain behind the eyes may actually be caused by tense muscles in the neck and shoulders. This means that the place that hurts may not be the part of the body that needs treatment.  Is it a migraine?  Migraine is a vascular headache that causes throbbing pain felt on one (most common) or both sides (less common)  "of the head. You may feel nauseated or vomit. This headache may also be preceded or associated with changes in sight (like seeing spots or flashes of light), ability to speak, or sensation (aura). There are a wide variety of environmental and food-related triggers for migraines. The pain may last for 4 to 72 hours. Afterward, you may feel shaky for a day or so. If this is the first time you experience these symptoms, you should immediately seek medical attention because you could be having a stroke.  Is it a tension headache?  This type of headache is usually a dull ache or a sensation of pressure on both sides of the head. It may be associated with pain or tension in the neck and shoulders. Depression, anxiety, and stress can cause a tension headache. The pain may not have a definite beginning or end. It may come and go, or seem never to go away.  When to call the healthcare provider  Call your healthcare provider for headaches that happen along with any of these symptoms:    Sudden, severe headache that is different from your usual headache pain    Headache associated with fever    Sudden headache associated with stiff neck    Slurred speech    Recurring headache in children     Ongoing numbness or muscle weakness    Loss of vision    Pain following a head injury    Convulsions, or a change in mental awareness    A headache you would call \"the worst headache you've ever had\"    New headaches in a pregnant woman   Date Last Reviewed: 1/1/2018 2000-2018 [employer]. [address] [place] [address]. All rights reserved. This information is not intended as a substitute for professional medical care. Always follow your healthcare professional's instructions.                Follow-ups after your visit        Additional Services     MENTAL HEALTH REFERRAL  - Adult; Psychiatry and Medication Management; Psychiatry; FM: [facility] Psychiatry Service [phone].  Medication management " & future refills will be returned to [facility]CP upon completion of evaluation; We deidre...       All scheduling is subject to the client's specific insurance plan & benefits, provider/location availability, and provider clinical specialities.  Please arrive 15 minutes early for your first appointment and bring your completed paperwork.    Please be aware that coverage of these services is subject to the terms and limitations of your health insurance plan.  Call member services at your health plan with any benefit or coverage questions.                      NEUROLOGY ADULT REFERRAL       Your provider has referred you for the following:   Consult at [facility]: [facility] [phone]   [email or web address]    Please be aware that coverage of these services is subject to the terms and limitations of your health insurance plan.  Call member services at your health plan with any benefit or coverage questions.      Please bring the following with you to your appointment:    (1) Any X-Rays, CTs or MRIs which have been performed.  Contact the facility where they were done to arrange for  prior to your scheduled appointment.    (2) List of current medications  (3) This referral request   (4) Any documents/labs given to you for this referral                  Who to contact     If you have questions or need follow up information about today's clinic visit or your schedule please contact [facility] directly at [phone].  Normal or non-critical lab and imaging results will be communicated to you by MyChart, letter or phone within 4 business days after the clinic has received the results. If you do not hear from us within 7 days, please contact the clinic through MyChart or phone. If you have a critical or abnormal lab result, we will notify you by phone as soon as possible.  Submit refill requests through [employer] or call your pharmacy and they will forward the  "refill request to us. Please allow 3 business days for your refill to be completed.          Additional Information About Your Visit        Care EveryWhere ID     This is your Care EveryWhere ID. This could be used by other organizations to access your [facility] medical records  [id number]        Your Vitals Were     Pulse Temperature Respirations Height Last Period BMI (Body Mass Index)    72 97.8  F (36.6  C) (Tympanic) 18 5' 3\" (1.6 m) 12/04/2018 (Exact Date) 27.63 kg/m2       Blood Pressure from Last 3 Encounters:   12/05/18 124/70   07/03/18 116/68    Weight from Last 3 Encounters:   12/05/18 156 lb (70.8 kg)   07/03/18 150 lb 3.2 oz (68.1 kg)              We Performed the Following     HPV High Risk Types DNA Cervical     MENTAL HEALTH REFERRAL  - Adult; Psychiatry and Medication Management; Psychiatry; [facility]: [facility]iatry Service [phone].  Medication management & future refills will be returned to [facility] PCP upon completion of evaluation; We deidre...     NEUROLOGY ADULT REFERRAL     Pap imaged thin layer screen with HPV - recommended age 30 - 65 years (select HPV order below)          Today's Medication Changes          These changes are accurate as of 12/5/18  3:33 PM.  If you have any questions, ask your nurse or doctor.               Start taking these medicines.        Dose/Directions    cyclobenzaprine 10 MG tablet   Commonly known as:  FLEXERIL   Used for:  Tension headache   Started by:  Krysta Webster APRN CNP        Dose:  5-10 mg   Take 0.5-1 tablets (5-10 mg) by mouth 3 times daily as needed for muscle spasms   Quantity:  30 tablet   Refills:  1            Where to get your medicines      These medications were sent to [facility] [address] [place] [id number]     Phone:  [phone]     cyclobenzaprine 10 MG tablet                Primary Care Provider Fax #    Physician No Ref-Primary [phone]       No " address on file        Equal Access to Services     [facility] ARIAN : Hadii aad ku haddavie Logan, waduane refugiomalu, montrell menacarmenodalis estrada, waxlona taye engeldomenicabernadette walker. So [facility] [phone].    ATENCIÓN: Si habla español, tiene a harden disposición servicios gratuitos de asistencia lingüística. Llame al [phone].    We comply with applicable federal civil rights laws and [place] laws. We do not discriminate on the basis of race, color, national origin, age, disability, sex, sexual orientation, or gender identity.            Thank you!     Thank you for choosing [facility]  for your care. Our goal is always to provide you with excellent care. Hearing back from our patients is one way we can continue to improve our services. Please take a few minutes to complete the written survey that you may receive in the mail after your visit with us. Thank you!             Your Updated Medication List - Protect others around you: Learn how to safely use, store and throw away your medicines at www.disposemymeds.org.          This list is accurate as of 12/5/18  3:33 PM.  Always use your most recent med list.                   Brand Name Dispense Instructions for use Diagnosis    cyclobenzaprine 10 MG tablet    FLEXERIL    30 tablet    Take 0.5-1 tablets (5-10 mg) by mouth 3 times daily as needed for muscle spasms    Tension headache

## 2018-12-05 NOTE — PATIENT INSTRUCTIONS
Your provider has referred you for the following:   Consult at [facility]: [facility] [phone]   [email or web address]    Preventive Health Recommendations  Female Ages 26 - 39  Yearly exam:   See your health care provider every year in order to    Review health changes.     Discuss preventive care.      Review your medicines if you your doctor has prescribed any.    Until age 30: Get a Pap test every three years (more often if you have had an abnormal result).    After age 30: Talk to your doctor about whether you should have a Pap test every 3 years or have a Pap test with HPV screening every 5 years.   You do not need a Pap test if your uterus was removed (hysterectomy) and you have not had cancer.  You should be tested each year for STDs (sexually transmitted diseases), if you're at risk.   Talk to your provider about how often to have your cholesterol checked.  If you are at risk for diabetes, you should have a diabetes test (fasting glucose).  Shots: Get a flu shot each year. Get a tetanus shot every 10 years.   Nutrition:     Eat at least 5 servings of fruits and vegetables each day.    Eat whole-grain bread, whole-wheat pasta and brown rice instead of white grains and rice.    Get adequate Calcium and Vitamin D.     Lifestyle    Exercise at least 150 minutes a week (30 minutes a day, 5 days of the week). This will help you control your weight and prevent disease.    Limit alcohol to one drink per day.    No smoking.     Wear sunscreen to prevent skin cancer.    See your dentist every six months for an exam and cleaning.    Adjustment Disorder  Life changes--work, family, parents, children--each can cause a great deal of stress in life. An adjustment disorder means you have trouble dealing with this change and stress. This problem can have serious results. You may feel helpless, depressed, make bad decisions, or even feel like you want to hurt yourself.  Adjustment  disorder can cause anxiety or depression. It is triggered by daily stresses such as:    Death of a loved one    Divorce    Marriage    General life changes such as changing or leaving a job    Moving    Illness or other health issue for you or a family member    Sex    Money     Symptoms may include:    Sadness or crying    Anxiety    Insomnia    Poor concentration    Trouble doing simple things    New problems at work or with family or friends    Loss of self-esteem    Sense of hopelessness    Feeling trapped or cut off from others  With this condition, it is common to feel sad, guilty, hopeless, and restless. These feelings may continue for weeks or months. It can be helpful to identify what is causing the additional stress and take steps to get extra support. If new stressful events do not happen, it is likely that you will gradually start feeling better.  Home care    If you have been given a prescription for medicine, take it as directed.    It helps to talk about your feelings and thoughts with family or friends who understand and support you.  Follow-up care  Follow up with your healthcare provider, or therapist as advised. Let them know if this condition does not improve or gets worse.  When to seek medical advice  Call your healthcare provider right away if any of these happen:    Worsening depression or anxiety    Feeling out of control    Thoughts of harming yourself or another    Being unable to care for yourself  Date Last Reviewed: 10/1/2017    2341-3654 [employer]. [address] [place] [address], [place] [address]. All rights reserved. This information is not intended as a substitute for professional medical care. Always follow your healthcare professional's instructions.        What Are Migraine and Tension Headaches?    Although there are several types of headaches, migraine and tension headaches affect the most people. When you have a headache, it isn't your brain that's hurting. Your head  aches because nerves in the bones, blood vessels, meninges, and muscles of your head are irritated. These irritated nerves send pain signals to the brain, which identifies where you hurt and how bad the pain is.  Talk with your healthcare provider about a treatment plan that may help relieve pain and prevent future headaches.   What causes your headache?  The actual headache process is not yet understood. Only rarely are headaches a sign of a serious medical problem such as a tumor. Headache pain may be caused by abnormal interaction between the brain and the nerves and blood vessels in the head. A previous head injury or concussion, neck pain, environmental stresses, muscle tension, anxiety, depression, fatigue, skipping meals, or certain foods and drinks may trigger headache pain.  Brain scans are rarely needed and only for certain danger sign symptoms. CT scans are associated with potential radiation effects and potential inaccurate false findings.  What is referred pain?  Headache pain can be referred pain, which is pain that has its source in one place but is felt in another. For example, pain behind the eyes may actually be caused by tense muscles in the neck and shoulders. This means that the place that hurts may not be the part of the body that needs treatment.  Is it a migraine?  Migraine is a vascular headache that causes throbbing pain felt on one (most common) or both sides (less common) of the head. You may feel nauseated or vomit. This headache may also be preceded or associated with changes in sight (like seeing spots or flashes of light), ability to speak, or sensation (aura). There are a wide variety of environmental and food-related triggers for migraines. The pain may last for 4 to 72 hours. Afterward, you may feel shaky for a day or so. If this is the first time you experience these symptoms, you should immediately seek medical attention because you could be having a stroke.  Is it a tension  "headache?  This type of headache is usually a dull ache or a sensation of pressure on both sides of the head. It may be associated with pain or tension in the neck and shoulders. Depression, anxiety, and stress can cause a tension headache. The pain may not have a definite beginning or end. It may come and go, or seem never to go away.  When to call the healthcare provider  Call your healthcare provider for headaches that happen along with any of these symptoms:    Sudden, severe headache that is different from your usual headache pain    Headache associated with fever    Sudden headache associated with stiff neck    Slurred speech    Recurring headache in children     Ongoing numbness or muscle weakness    Loss of vision    Pain following a head injury    Convulsions, or a change in mental awareness    A headache you would call \"the worst headache you've ever had\"    New headaches in a pregnant woman   Date Last Reviewed: 1/1/2018 2000-2018 [employer]. [address] [place] [address], [place] [address]. All rights reserved. This information is not intended as a substitute for professional medical care. Always follow your healthcare professional's instructions.        "

## 2018-12-05 NOTE — LETTER
December 15, 2018    Omayra Cadena  [address] [facility] [address]    Dear Omayra,  We are happy to inform you that your PAP smear result from 12/5/18 is normal.  We are now able to do a follow up test on PAP smears. The DNA test is for HPV (Human Papilloma Virus). Cervical cancer is closely linked with certain types of HPV. Your results showed no evidence of high risk HPV.  Therefore we recommend you return in 3 years for your next pap smear.  You will still need to return to the clinic every year for an annual exam and other preventive tests.  If you have additional questions regarding this result, please call our registered nurse, Fiona at [phone].  Sincerely,    ANIBAL Hancock CNP/rlm

## 2018-12-07 ASSESSMENT — ANXIETY QUESTIONNAIRES: GAD7 TOTAL SCORE: 16

## 2018-12-09 LAB
COPATH REPORT: NORMAL
PAP: NORMAL

## 2018-12-11 LAB
FINAL DIAGNOSIS: NORMAL
HPV HR 12 DNA CVX QL NAA+PROBE: NEGATIVE
HPV16 DNA SPEC QL NAA+PROBE: NEGATIVE
HPV18 DNA SPEC QL NAA+PROBE: NEGATIVE
SPECIMEN DESCRIPTION: NORMAL
SPECIMEN SOURCE CVX/VAG CYTO: NORMAL

## 2018-12-19 ENCOUNTER — TELEPHONE (OUTPATIENT)
Dept: FAMILY MEDICINE | Facility: CLINIC | Age: 33
End: 2018-12-19

## 2018-12-19 NOTE — TELEPHONE ENCOUNTER
Reason for Call:  Other call back    Detailed comments: Pt was in to see Krysta Darin and told to call back with update on headaches. Pt has migraines and was given Flexerall and taken off of everything else she was using. Now the headaches start in the morning instead of the afternoon and are more intense. Its not working. What is the next step?    Phone Number Patient can be reached at: Home number on file [phone] (home)    Best Time: any    Can we leave a detailed message on this number?     Call taken on 12/19/2018 at 8:23 AM by Gunjan Boyce

## 2018-12-19 NOTE — TELEPHONE ENCOUNTER
Have her keep a log of headaches, how often, when they ar e relived and if any aggravating factors and make a follow-up appointment with me.    Krysta Webster CNP

## 2018-12-31 ENCOUNTER — TELEPHONE (OUTPATIENT)
Dept: FAMILY MEDICINE | Facility: CLINIC | Age: 33
End: 2018-12-31

## 2018-12-31 DIAGNOSIS — H10.9 CONJUNCTIVITIS: Primary | ICD-10-CM

## 2018-12-31 RX ORDER — ERYTHROMYCIN 5 MG/G
0.5 OINTMENT OPHTHALMIC 3 TIMES DAILY
Qty: 1 TUBE | Refills: 0 | Status: SHIPPED | OUTPATIENT
Start: 2018-12-31 | End: 2019-05-09

## 2018-12-31 NOTE — TELEPHONE ENCOUNTER
Reason for call:  Patient reporting a symptom    Symptom or request: Bilateral eyes, swollen, crusty plus cold symptoms x 2 days. Pt is wondering if she should be treated [facility]. The Family has Pink Eye. Please Advise.        Phone Number patient can be reached at:  Home number on file [phone] (home)    Best Time:  Any Time      Can we leave a detailed message on this number:  YES    Call taken on 12/31/2018 at 8:32 AM by Cherise Woodson

## 2019-01-07 PROBLEM — G43.011 INTRACTABLE MIGRAINE WITHOUT AURA AND WITH STATUS MIGRAINOSUS: Status: ACTIVE | Noted: 2019-01-07

## 2019-01-23 ENCOUNTER — OFFICE VISIT (OUTPATIENT)
Dept: PSYCHIATRY | Facility: CLINIC | Age: 34
End: 2019-01-23
Attending: NURSE PRACTITIONER
Payer: COMMERCIAL

## 2019-01-23 VITALS
SYSTOLIC BLOOD PRESSURE: 96 MMHG | HEART RATE: 74 BPM | DIASTOLIC BLOOD PRESSURE: 66 MMHG | WEIGHT: 159 LBS | BODY MASS INDEX: 28.17 KG/M2 | RESPIRATION RATE: 16 BRPM | OXYGEN SATURATION: 97 % | TEMPERATURE: 98.4 F | HEIGHT: 63 IN

## 2019-01-23 DIAGNOSIS — F41.1 GENERALIZED ANXIETY DISORDER: Primary | ICD-10-CM

## 2019-01-23 PROCEDURE — 90792 PSYCH DIAG EVAL W/MED SRVCS: CPT | Performed by: NURSE PRACTITIONER

## 2019-01-23 RX ORDER — VENLAFAXINE HYDROCHLORIDE 75 MG/1
75 CAPSULE, EXTENDED RELEASE ORAL DAILY
Qty: 30 CAPSULE | Refills: 1 | Status: SHIPPED | OUTPATIENT
Start: 2019-01-23 | End: 2019-03-12 | Stop reason: ALTCHOICE

## 2019-01-23 RX ORDER — VENLAFAXINE HYDROCHLORIDE 37.5 MG/1
CAPSULE, EXTENDED RELEASE ORAL
Qty: 7 CAPSULE | Refills: 0 | Status: SHIPPED | OUTPATIENT
Start: 2019-01-23 | End: 2019-03-12 | Stop reason: ALTCHOICE

## 2019-01-23 ASSESSMENT — PATIENT HEALTH QUESTIONNAIRE - PHQ9: SUM OF ALL RESPONSES TO PHQ QUESTIONS 1-9: 8

## 2019-01-23 ASSESSMENT — MIFFLIN-ST. JEOR: SCORE: 1400.35

## 2019-01-23 NOTE — PROGRESS NOTES
"                                                         Outpatient Psychiatric Evaluation- Standard  Adult    Name:  Omayra Cadena  : 1985    Source of Referral:  Primary Care Provider: ANIBAL Hancock CNP   Last visit: 18  Current Psychotherapist: Not currently   Last visit: Not interested      Identifying Data:  Patient is a 33 year old,   White American female  who presents for initial visit with me.  Patient is currently employed part time. Patient attended the session alone. Consent to communicate signed for Sarmad Cadena patient's Spouse/Partner. Consent for treatment signed and included in electronic medical record. Discussed limits of confidentiality today. My Practice Policy was reviewed and signed.     Patient prefers to be called: \"Omayra\"    Chief Complaint:    Patient reports: \"I'm always worrying.\"      HPI:    Patient reports onset of anxiety after having children, now aged 7 and 4. States she's always second guessing, doubting (e.g. Worries she didn't pack the kids lunch). Denies any particular outsized paranoia or truly irrational obsessions. Ruminates in head, and sometimes double checks things, but denies any particularly extensive compulsively behavior. States she tends to be easily distracted and caught in own head; will lose track of conversations, etc. Mood \"can be happy, but it feels forced\"; routinely gets easily irritable. Knows her routine mid-day headaches are related to her anxiety; denies any other physical manifestations. Sleep largely OK, if not staying up late with a book. Never had a morning/early-day appetite, so not clear it's anxiety related.    States she tends to cope with day's anxiety by putting family to bed and \"being along on couch\" for a hour or two; will put on TV, but isn't even watching closely. Used to do CrossFit regularly before she moved; states regular exercise was helpful with anxiety.    Saw a therapist in [place] for 6-7 " "months; would see weekly \"for crying sessions\"; not clear they did CBT. Liked therapist, but since she and family moved from [place] [facility] to [facility] in 11/2017 she hasn't been able to see therapist. Would be open to seeing a [facility] therapist close to home.    Patient reports trials of antidepressants via PCP over past few years. Can't recall much detail, but states she took Lexapro, Prozac, Zoloft, Wellbutrin. All for 2 months length, usually several months in between trials. States all these meds made her feel \"emotionless\" but did not attend to anxiety.        Work: ; loves her work; can be a bit distractible there  Family: son on IEP, mild social difficulties  : gets along fine; us vs me time              Recs:  CBT and returning to exercise  Effexor          Psychiatric Review of Symptoms:  Depression: Interest: Decrease   PHQ-9 scores:   PHQ-9 SCORE 12/5/2018   PHQ-9 Total Score MyChart 11 (Moderate depression)   PHQ-9 Total Score 11     Ashley:  No symptoms   MDQ Score: Negative Screen  Anxiety: Feeling nervous, anxious, or on edge  Uncontrolled worrying  Worrying too much about different things  Trouble relaxing  Easily annoyed or irritable    ABDI-7 scores:    ABDI-7 SCORE 12/5/2018   Total Score 16 (severe anxiety)   Total Score 16     Panic:  No symptoms   Agoraphobia:  No   PTSD:  No symptoms   OCD:  No symptoms   Psychosis: No symptoms   ADD / ADHD: No symptoms  Gambling or shoplifting: No   Eating Disorder:  No symptoms  Sleep:   No symptoms     Psychiatric History:   No hospitalizations  No suicide attempts      Substance Use History:  ETOH: rare  Cannabis: no  No other substance use  Caffeine: not really    Past Medical History:  No past medical history on file.   Surgery: No past surgical history on file.  Allergies:   No Known Allergies  Primary Care Provider: ANIBAL Hancock CNP  Seizures or Head Injury: No    Chronic headaches; Topamax sometime in last 4 years; thought it " "was working, then seemed to dissipate; 25 to 75 mg; decreased appetite    Current Medications:    Current Outpatient Medications:      cyclobenzaprine (FLEXERIL) 10 MG tablet, Take 0.5-1 tablets (5-10 mg) by mouth 3 times daily as needed for muscle spasms, Disp: 30 tablet, Rfl: 1    The [place] Prescription Monitoring Program has been reviewed and there are no concerns about diversionary activity for controlled substances at this time.    Vital Signs:  Vitals: BP 96/66 (BP Location: Left arm, Patient Position: Chair, Cuff Size: Adult Regular)   Pulse 74   Temp 98.4  F (36.9  C) (Oral)   Resp 16   Ht 1.6 m (5' 3\")   Wt 72.1 kg (159 lb)   LMP 01/21/2019 (Exact Date)   SpO2 97%   Breastfeeding? No   BMI 28.17 kg/m      Labs:  Most recent labs reviewed and no new labs.       Review of Systems:  10 systems (general, cardiovascular, respiratory, eyes, ENT, endocrine, GI, , M/S, neurological) were reviewed. Most pertinent finding(s) is/are: recurrent headaches. The remaining systems are all unremarkable.    Family History:   Patient reported family history includes: No family history on file.    Mother: suspected depression; maybe was on antidepressants      Social History:   Grew up in [place]  1 sister, 4 half-siblings  Has associates degree  Works at [employer]; loves work  ; 6 y/o son, 5 y/o daughter  Moved to [facility] in 11/2017      Mental Status Examination:     Appearance:  awake, alert and adequately groomed  Attitude:  cooperative   Eye Contact:  good  Gait and Station: Normal  Psychomotor Behavior:  intact station, gait and muscle tone  Oriented to:  time, person, and place  Attention Span and Concentration:  Normal  Speech:  clear, coherent  Mood:  anxious  Affect:  appropriate and in normal range  Associations:  no loose associations  Thought Process:  logical, linear and goal oriented  Thought Content:  Appropriate to Interview  Recent and Remote Memory:  intact Not formally " assessed. No amnesia.  Fund of Knowledge: appropriate  Insight:  good  Judgment:  intact  Impulse Control:  intact    Suicide Risk Assessment:  Today Omayra Cadena denies any suicidal ideation or self harm impulses. Therefore, based on all available evidence including the factors cited above, Omayra Cadena does not appear to be at imminent risk for self-harm, does not meet criteria for a 72-hr hold, and therefore remains appropriate for ongoing outpatient level of care.  A thorough assessment of risk factors related to suicide and self-harm have been reviewed and are noted above. The patient convincingly denies acute suicidality on several occasions. Local community safety resources reviewed and printed for patient to use if needed. There was no deceit detected, and the patient presented in a manner that was believable.     DSM5  Diagnosis:  300.02 (F41.1) Generalized Anxiety Disorder    Medical Comorbidities Include:   Patient Active Problem List    Diagnosis Date Noted     Intractable migraine without aura and with status migrainosus 01/07/2019     Priority: Medium         A 12-item WHODAS 2.0 assessment was completed by the patient today and recorded in [employer].  No flowsheet data found.    The Patient Activation Measure (АНДРЕЙ) score was completed and recorded in [employer]. This assesses patient knowledge, skill, and confidence for self-management. No flowsheet data found.             Impression:  Omayra Cadena is a 33 year old female with a history of generalized anxiety. Her symptoms are relatively mild and not de-habilitating, but still significant enough to affect quality of life. Given her insights into the clear cognitive pattern of her anxiety, I firstly recommend she engage in CBT. Secondly, returning to routine exercise. And if she's interested, a trial of a SNRI. She's interested in all of the above.    Medication side effects and alternatives reviewed. Health promotion activities recommended  and reviewed today. All questions addressed. Education and counseling completed regarding risks and benefits of medications and psychotherapy options. Collaborative Care Psychiatry Service model reviewed today. Recommend therapy for additional support.     Treatment Plan:    Referred to [facility] for psychotherapy    Start Effexor 37.5 mg daily x 7 days, then increase to 75 mg daily    Continue all other medical directions per primary care provider.     Continue all other medications as reviewed per electronic medical record today.     Safety plan reviewed. To the Emergency Department as needed or call after hours crisis line at [phone] or [phone]. [place] Crisis Text Line: Text [place] to 383240  or  Suicide LifeLine Chat: suicideOnset Technology.org/chat/    Schedule an appointment with me in 4-6 weeks or sooner as needed.  Call [facility] at [phone] to schedule.    Follow up with primary care provider as planned or for acute medical concerns.    Call the psychiatric nurse line with medication questions or concerns at [phone].    [employer] may be used to communicate with your provider, but this is not intended to be used for emergencies.    Community Resources:    National Suicide Prevention Lifeline: 140.880.5069 (TTY: 409.916.1155). Call anytime for help.  (www.suicidepreventionlifeline.org)  National [place] on Mental Illness (www.rajni.org): 399.280.1143 or 027-669-7689.   Mental Health Association (www.mentalhealth.org): 730.792.4800 or 636-137-4276.  [place] Crisis Text Line: Text [place] to 846378  Suicide LifeLine Chat: suicideOnset Technology.org/chat    Administrative Billing:   Time spent with patient was 60 minutes and greater than 50% of time or 40 minutes was spent in counseling and coordination of care regarding above diagnoses and treatment plan.    Patient Status:  Patient will continue to be seen for ongoing consultation and stabilization.    Signed:   Ricardo MIRANDA  Latricia, CNP  Psychiatry

## 2019-03-12 ENCOUNTER — OFFICE VISIT (OUTPATIENT)
Dept: PSYCHIATRY | Facility: CLINIC | Age: 34
End: 2019-03-12
Payer: COMMERCIAL

## 2019-03-12 VITALS
TEMPERATURE: 97.6 F | RESPIRATION RATE: 16 BRPM | HEART RATE: 76 BPM | OXYGEN SATURATION: 99 % | BODY MASS INDEX: 27.99 KG/M2 | WEIGHT: 158 LBS | SYSTOLIC BLOOD PRESSURE: 104 MMHG | DIASTOLIC BLOOD PRESSURE: 64 MMHG

## 2019-03-12 DIAGNOSIS — F41.1 GENERALIZED ANXIETY DISORDER: Primary | ICD-10-CM

## 2019-03-12 PROCEDURE — 99214 OFFICE O/P EST MOD 30 MIN: CPT | Performed by: NURSE PRACTITIONER

## 2019-03-12 RX ORDER — DULOXETIN HYDROCHLORIDE 20 MG/1
20 CAPSULE, DELAYED RELEASE ORAL 2 TIMES DAILY
Qty: 60 CAPSULE | Refills: 1 | Status: SHIPPED | OUTPATIENT
Start: 2019-03-12 | End: 2019-05-22

## 2019-03-12 ASSESSMENT — ANXIETY QUESTIONNAIRES
GAD7 TOTAL SCORE: 15
1. FEELING NERVOUS, ANXIOUS, OR ON EDGE: NEARLY EVERY DAY
GAD7 TOTAL SCORE: 15
6. BECOMING EASILY ANNOYED OR IRRITABLE: NEARLY EVERY DAY
5. BEING SO RESTLESS THAT IT IS HARD TO SIT STILL: SEVERAL DAYS
4. TROUBLE RELAXING: MORE THAN HALF THE DAYS
GAD7 TOTAL SCORE: 15
2. NOT BEING ABLE TO STOP OR CONTROL WORRYING: NEARLY EVERY DAY
7. FEELING AFRAID AS IF SOMETHING AWFUL MIGHT HAPPEN: NOT AT ALL
3. WORRYING TOO MUCH ABOUT DIFFERENT THINGS: NEARLY EVERY DAY
7. FEELING AFRAID AS IF SOMETHING AWFUL MIGHT HAPPEN: NOT AT ALL

## 2019-03-12 ASSESSMENT — PATIENT HEALTH QUESTIONNAIRE - PHQ9
SUM OF ALL RESPONSES TO PHQ QUESTIONS 1-9: 12
SUM OF ALL RESPONSES TO PHQ QUESTIONS 1-9: 12
10. IF YOU CHECKED OFF ANY PROBLEMS, HOW DIFFICULT HAVE THESE PROBLEMS MADE IT FOR YOU TO DO YOUR WORK, TAKE CARE OF THINGS AT HOME, OR GET ALONG WITH OTHER PEOPLE: SOMEWHAT DIFFICULT

## 2019-03-12 NOTE — PROGRESS NOTES
"    Outpatient Psychiatric Progress Note    Name: Omayra Cadena   : 1985                    Primary Care Provider: ANIBAL Hancock CNP - last visit 2018  Therapist: No - last visit N/A    PHQ-9 scores:  PHQ-9 SCORE 2018 2019 3/12/2019   PHQ-9 Total Score MyChart 11 (Moderate depression) - 12 (Moderate depression)   PHQ-9 Total Score 11 8 12       ABDI-7 scores:  ABDI-7 SCORE 2018 3/12/2019   Total Score 16 (severe anxiety) 15 (severe anxiety)   Total Score 16 15       Patient Identification:  Patient is a 33 year old year old,   White American female  who presents for return visit with me.  Patient is currently employed part time. Patient attended the session alone. Patient prefers to be called: \"Omayra\".    Interim History:  I last saw Omayra Cadena for outpatient psychiatry Consultation on 19.     During that appointment, we reviewed her psychiatric history, inclusive of past brief trial of antidepressants. We agreed to a trial of Effexor. She was also to reinitiate therapy with her former therapist, and inquire specifically about CBT. She was also encouraged to return to regular exercise, as she previously found this helpful in managing her anxiety.      Current medications include:   Current Outpatient Medications   Medication Sig     venlafaxine (EFFEXOR-XR) 37.5 MG 24 hr capsule 1 cap daily for 7 days, then increase to 75 mg daily     venlafaxine (EFFEXOR-XR) 75 MG 24 hr capsule Take 1 capsule (75 mg) by mouth daily     No current facility-administered medications for this visit.        The [place] Prescription Monitoring Program has been reviewed and there are no concerns about diversionary activity for controlled substances at this time.      I was able to review most recent Primary Care Provider, specialty provider, and therapy visit notes that I have access to.     Today, patient reports she initiated Effexor 6 weeks ago. Was initially quite " sedating, so she switched to bedtime dosing. Tapered to 75 mg nightly. Has had persistent night sweats leading to break-outs on back. Denies any therapeutic effect towards anxiety. Is inclined to discontinue.    She did contact her former therapist. States therapist happy to do CBT work with her, however was booked out on the days patient is available. Patient hasn't scheduled any concrete day, but plans to do so.    Patient signed up for a Cross Fit-like gym class; has been doing twice weekly. States it's been helpful.    Her anxiety is otherwise status quo; easily rigid and overwhelmed.    Patient reports continued persistent headaches. Makes sure to hydrate; wears sunglasses; routine use of Excedrin.      No past medical history on file.   has no past medical history on file.    Social history updates:  No significant updates.    Substance use updates:  ETOH: rare  Cannabis: no  No other substance use  Caffeine: not really      Vital Signs:   /64 (BP Location: Left arm, Patient Position: Sitting, Cuff Size: Adult Regular)   Pulse 76   Temp 97.6  F (36.4  C) (Oral)   Resp 16   Wt 71.7 kg (158 lb)   SpO2 99%   BMI 27.99 kg/m      Labs:  Most recent laboratory results reviewed and no new labs.    Review of Systems:  10 systems (general, cardiovascular, respiratory, eyes, ENT, endocrine, GI, , M/S, neurological) were reviewed. Most pertinent finding(s) is/are: headaches. The remaining systems are all unremarkable.    Mental Status Examination:     Appearance:  awake, alert and adequately groomed  Attitude:  cooperative   Eye Contact:  good  Gait and Station: Normal  Psychomotor Behavior:  intact station, gait and muscle tone  Oriented to:  time, person, and place  Attention Span and Concentration:  Normal  Speech:  clear, coherent  Mood:  anxious  Affect:  appropriate and in normal range  Associations:  no loose associations  Thought Process:  logical, linear and goal oriented  Thought Content:   Appropriate to Interview  Recent and Remote Memory:  intact Not formally assessed. No amnesia.  Fund of Knowledge: appropriate  Insight:  good  Judgment:  intact  Impulse Control:  intact     Suicide Risk Assessment:  Today Omayra Cadena denies any suicidal ideation or self harm impulses. Therefore, based on all available evidence including the factors cited above, Omayra Cadena does not appear to be at imminent risk for self-harm, does not meet criteria for a 72-hr hold, and therefore remains appropriate for ongoing outpatient level of care.  A thorough assessment of risk factors related to suicide and self-harm have been reviewed and are noted above. The patient convincingly denies acute suicidality on several occasions. Local community safety resources reviewed and printed for patient to use if needed. There was no deceit detected, and the patient presented in a manner that was believable.      DSM5  Diagnosis:  300.02 (F41.1) Generalized Anxiety Disorder      Medical comorbidities include:   Patient Active Problem List    Diagnosis Date Noted     Generalized anxiety disorder 01/23/2019     Priority: Medium     Intractable migraine without aura and with status migrainosus 01/07/2019     Priority: Medium         Assessment:  Omayra Cadena reports persistent and intolerable night sweats with Effexor, with no noticeable therapeutic value. We agreed to trial Cymbalta instead. I reiterate my belief that she would primarily benefit from CBT, and encourage her to set up regular visits with her former therapist. I commend her efforts towards an exercise routine, and encourage her to continue this.    Medication side effects and alternatives were reviewed. Health promotion activities recommended and reviewed today. All questions addressed. Education and counseling completed regarding risks and benefits of medications and psychotherapy options.    Treatment Plan:    Stop Effexor    Start Cymbalta: 20 mg  daily for 1 week, then 40 mg daily    Continue all other medical directions per primary care provider.     Continue all other medications as reviewed per electronic medical record today.     Safety plan reviewed. To the Emergency Department as needed or call after hours crisis line at [phone] or [phone]. [place] Crisis Text Line: Text [place] to 999192  or  Suicide LifeLine Chat: suicidepreventionPlink Searchline.org/chat/    Schedule an appointment with me in 4-6 weeks or sooner as needed.  Call [facility] at [phone] to schedule.    Follow up with primary care provider as planned or for acute medical concerns.    Call the psychiatric nurse line with medication questions or concerns at [phone].    [employer]ak[employer]ost may be used to communicate with your provider, but this is not intended to be used for emergencies.    Administrative Billing:   Time spent with patient was 30 minutes and greater than 50% of time or 20 minutes was spent in counseling and coordination of care regarding above diagnoses and treatment plan.    Patient Status:  Patient will continue to be seen for ongoing consultation and stabilization.    Signed:   Ricardo Rome CNP   Psychiatry

## 2019-03-13 ASSESSMENT — PATIENT HEALTH QUESTIONNAIRE - PHQ9: SUM OF ALL RESPONSES TO PHQ QUESTIONS 1-9: 12

## 2019-03-13 ASSESSMENT — ANXIETY QUESTIONNAIRES: GAD7 TOTAL SCORE: 15

## 2019-05-05 ENCOUNTER — OFFICE VISIT (OUTPATIENT)
Dept: URGENT CARE | Facility: URGENT CARE | Age: 34
End: 2019-05-05
Payer: COMMERCIAL

## 2019-05-05 VITALS
HEART RATE: 93 BPM | WEIGHT: 159.6 LBS | BODY MASS INDEX: 28.27 KG/M2 | OXYGEN SATURATION: 97 % | TEMPERATURE: 98.4 F | SYSTOLIC BLOOD PRESSURE: 100 MMHG | DIASTOLIC BLOOD PRESSURE: 69 MMHG

## 2019-05-05 DIAGNOSIS — B34.9 VIRAL SYNDROME: Primary | ICD-10-CM

## 2019-05-05 DIAGNOSIS — R05.9 COUGH: ICD-10-CM

## 2019-05-05 PROCEDURE — 99213 OFFICE O/P EST LOW 20 MIN: CPT

## 2019-05-05 RX ORDER — BENZONATATE 200 MG/1
200 CAPSULE ORAL 3 TIMES DAILY PRN
Qty: 30 CAPSULE | Refills: 0 | Status: SHIPPED | OUTPATIENT
Start: 2019-05-05 | End: 2019-05-09

## 2019-05-05 NOTE — PROGRESS NOTES
SUBJECTIVE:   Omayra Cadena is a 34 year old female who presents to clinic today for the following   health issues:  Chief Complaint   Patient presents with     Sinus Problem     nasal drainage, sinus pressure and pain x3 days                 Additional history: as documented    Reviewed  and updated as needed this visit by clinical staff  Tobacco  Allergies  Meds  Problems  Med Hx  Surg Hx  Fam Hx         Reviewed and updated as needed this visit by Provider  Tobacco  Allergies  Meds  Problems  Med Hx  Surg Hx  Fam Hx         Patient Active Problem List   Diagnosis     Intractable migraine without aura and with status migrainosus     Generalized anxiety disorder     History reviewed. No pertinent surgical history.    Social History     Tobacco Use     Smoking status: Former Smoker     Types: Other     Smokeless tobacco: Never Used     Tobacco comment: e-cig   Substance Use Topics     Alcohol use: Yes     Comment: once every 6 months     History reviewed. No pertinent family history.      Current Outpatient Medications   Medication Sig Dispense Refill     benzonatate (TESSALON) 200 MG capsule Take 1 capsule (200 mg) by mouth 3 times daily as needed for cough 30 capsule 0     DULoxetine (CYMBALTA) 20 MG capsule Take 1 capsule (20 mg) by mouth 2 times daily 60 capsule 1     No Known Allergies  Labs reviewed in EPIC    ROS:  Constitutional, HEENT, cardiovascular, pulmonary, GI, , musculoskeletal, neuro, skin, endocrine and psych systems are negative, except as otherwise noted.    OBJECTIVE:     /69 (BP Location: Right arm, Cuff Size: Adult Regular)   Pulse 93   Temp 98.4  F (36.9  C) (Tympanic)   Wt 72.4 kg (159 lb 9.6 oz)   SpO2 97%   BMI 28.27 kg/m    Body mass index is 28.27 kg/m .   GENERAL: healthy, alert and no distress, nontoxic in appearance  EYES: Eyes grossly normal to inspection, PERRL and conjunctivae and sclerae normal  HENT: ear canals and TM's normal, nose and mouth  "without ulcers or lesions  NECK: no adenopathy, supple with full ROM  RESP: lungs clear to auscultation - no rales, rhonchi or wheezes  CV: regular rate and rhythm, normal S1 S2, no S3 or S4, no murmur, click or rub, no peripheral edema   ABDOMEN: soft, nontender, no hepatosplenomegaly, no masses and bowel sounds normal  MS: no gross musculoskeletal defects noted, no edema  No rash    Diagnostic Test Results:  No results found for this or any previous visit (from the past 24 hour(s)).    ASSESSMENT/PLAN:     Problem List Items Addressed This Visit     None      Visit Diagnoses     Viral syndrome    -  Primary    Cough        Relevant Medications    benzonatate (TESSALON) 200 MG capsule               Patient Instructions   Increase rest and fluids. Tylenol and/or Ibuprofen for comfort. Cool mist vaporizer. If your symptoms worsen or do not resolve follow up with your primary care provider in 1 week and sooner if needed.      Mucinex 600 mg 12 hour formula for ear, head and chest congestion.  It can also thin post nasal drip which can cause a cough and sore throat.    Indications for emergent return to emergency department discussed with patient, who verbalized good understanding and agreement.  Patient understands the limitations of today's evaluation.           Patient Education     Viral Syndrome (Adult)  A viral illness may cause a number of symptoms such as fever. Other symptoms depend on the part of the body that the virus affects. If it settles in your nose, throat, and lungs, it may cause cough, sore throat, congestion, runny nose, headache, earache and other ear symptoms, or shortness of breath. If it settles in your stomach and intestinal tract, it may cause nausea, vomiting, cramping, and diarrhea. Sometimes it causes generalized symptoms like \"aching all over,\" feeling tired, loss of energy, or loss of appetite.  A viral illness usually lasts anywhere from several days to several weeks, but sometimes it " lasts longer. In some cases, a more serious infection can look like a viral syndrome in the first few days of the illness. You may need another exam and additional tests to know the difference. Watch for the warning signs listed below for when to seek medical advice.  Home care  Follow these guidelines for taking care of yourself at home:    If symptoms are severe, rest at home for the first 2 to 3 days.    Stay away from cigarette smoke - both your smoke and the smoke from others.    You may use over-the-counter acetaminophen or ibuprofen for fever, muscle aching, and headache, unless another medicine was prescribed for this. If you have chronic liver or kidney disease or ever had a stomach ulcer or gastrointestinal bleeding, talk with your healthcare provider before using these medicines. No one who is younger than 18 and ill with a fever should take aspirin. It may cause severe disease or death.    Your appetite may be poor, so a light diet is fine. Avoid dehydration by drinking 8 to 12, 8-ounce glasses of fluids each day. This may include water; orange juice; lemonade; apple, grape, and cranberry juice; clear fruit drinks; electrolyte replacement and sports drinks; and decaffeinated teas and coffee. If you have been diagnosed with a kidney disease, ask your healthcare provider how much and what types of fluids you should drink to prevent dehydration. If you have kidney disease, drinking too much fluid can cause it build up in the your body and be dangerous to your health.    Over-the-counter remedies won't shorten the length of the illness but may be helpful for symptoms such as cough, sore throat, nasal and sinus congestion, or diarrhea. Don't use decongestants if you have high blood pressure.  Follow-up care  Follow up with your healthcare provider if you do not improve over the next week.  Call 911  Call 911 if any of the following occur:    Convulsion    Feeling weak, dizzy, or like you are going to  faint    Chest pain, or more than mild shortness of breath  When to seek medical advice  Call your healthcare provider right away if any of these occur:    Cough with lots of colored sputum (mucus) or blood in your sputum    Chest pain, shortness of breath, wheezing, or trouble breathing    Severe headache; face, neck, or ear pain    Severe, constant pain in the lower right side of your belly (abdominal)    Continued vomiting (can t keep liquids down)    Frequent diarrhea (more than 5 times a day); blood (red or black color) or mucus in diarrhea    Feeling weak, dizzy, or like you are going to faint    Extreme thirst    Fever of 100.4 F (38 C) or higher, or as directed by your healthcare provider  Date Last Reviewed: 4/1/2018 2000-2018 [employer]. [address] [place] [address], [place] [address]. All rights reserved. This information is not intended as a substitute for professional medical care. Always follow your healthcare professional's instructions.             REAGAN Balderrama SAME DAY PROVIDER  [facility]

## 2019-05-05 NOTE — PATIENT INSTRUCTIONS
"Increase rest and fluids. Tylenol and/or Ibuprofen for comfort. Cool mist vaporizer. If your symptoms worsen or do not resolve follow up with your primary care provider in 1 week and sooner if needed.      Mucinex 600 mg 12 hour formula for ear, head and chest congestion.  It can also thin post nasal drip which can cause a cough and sore throat.    Indications for emergent return to emergency department discussed with patient, who verbalized good understanding and agreement.  Patient understands the limitations of today's evaluation.           Patient Education     Viral Syndrome (Adult)  A viral illness may cause a number of symptoms such as fever. Other symptoms depend on the part of the body that the virus affects. If it settles in your nose, throat, and lungs, it may cause cough, sore throat, congestion, runny nose, headache, earache and other ear symptoms, or shortness of breath. If it settles in your stomach and intestinal tract, it may cause nausea, vomiting, cramping, and diarrhea. Sometimes it causes generalized symptoms like \"aching all over,\" feeling tired, loss of energy, or loss of appetite.  A viral illness usually lasts anywhere from several days to several weeks, but sometimes it lasts longer. In some cases, a more serious infection can look like a viral syndrome in the first few days of the illness. You may need another exam and additional tests to know the difference. Watch for the warning signs listed below for when to seek medical advice.  Home care  Follow these guidelines for taking care of yourself at home:    If symptoms are severe, rest at home for the first 2 to 3 days.    Stay away from cigarette smoke - both your smoke and the smoke from others.    You may use over-the-counter acetaminophen or ibuprofen for fever, muscle aching, and headache, unless another medicine was prescribed for this. If you have chronic liver or kidney disease or ever had a stomach ulcer or gastrointestinal " bleeding, talk with your healthcare provider before using these medicines. No one who is younger than 18 and ill with a fever should take aspirin. It may cause severe disease or death.    Your appetite may be poor, so a light diet is fine. Avoid dehydration by drinking 8 to 12, 8-ounce glasses of fluids each day. This may include water; orange juice; lemonade; apple, grape, and cranberry juice; clear fruit drinks; electrolyte replacement and sports drinks; and decaffeinated teas and coffee. If you have been diagnosed with a kidney disease, ask your healthcare provider how much and what types of fluids you should drink to prevent dehydration. If you have kidney disease, drinking too much fluid can cause it build up in the your body and be dangerous to your health.    Over-the-counter remedies won't shorten the length of the illness but may be helpful for symptoms such as cough, sore throat, nasal and sinus congestion, or diarrhea. Don't use decongestants if you have high blood pressure.  Follow-up care  Follow up with your healthcare provider if you do not improve over the next week.  Call 911  Call 911 if any of the following occur:    Convulsion    Feeling weak, dizzy, or like you are going to faint    Chest pain, or more than mild shortness of breath  When to seek medical advice  Call your healthcare provider right away if any of these occur:    Cough with lots of colored sputum (mucus) or blood in your sputum    Chest pain, shortness of breath, wheezing, or trouble breathing    Severe headache; face, neck, or ear pain    Severe, constant pain in the lower right side of your belly (abdominal)    Continued vomiting (can t keep liquids down)    Frequent diarrhea (more than 5 times a day); blood (red or black color) or mucus in diarrhea    Feeling weak, dizzy, or like you are going to faint    Extreme thirst    Fever of 100.4 F (38 C) or higher, or as directed by your healthcare provider  Date Last Reviewed:  4/1/2018 2000-2018 [employer]. [address] [place] [address], [place] [address]. All rights reserved. This information is not intended as a substitute for professional medical care. Always follow your healthcare professional's instructions.

## 2019-05-09 ENCOUNTER — OFFICE VISIT (OUTPATIENT)
Dept: FAMILY MEDICINE | Facility: CLINIC | Age: 34
End: 2019-05-09
Payer: COMMERCIAL

## 2019-05-09 VITALS
SYSTOLIC BLOOD PRESSURE: 116 MMHG | WEIGHT: 159.4 LBS | HEIGHT: 63 IN | OXYGEN SATURATION: 97 % | RESPIRATION RATE: 18 BRPM | TEMPERATURE: 97.9 F | BODY MASS INDEX: 28.24 KG/M2 | DIASTOLIC BLOOD PRESSURE: 60 MMHG | HEART RATE: 90 BPM

## 2019-05-09 DIAGNOSIS — J01.00 ACUTE MAXILLARY SINUSITIS, RECURRENCE NOT SPECIFIED: ICD-10-CM

## 2019-05-09 DIAGNOSIS — H66.002 ACUTE SUPPURATIVE OTITIS MEDIA OF LEFT EAR WITHOUT SPONTANEOUS RUPTURE OF TYMPANIC MEMBRANE, RECURRENCE NOT SPECIFIED: Primary | ICD-10-CM

## 2019-05-09 PROCEDURE — 99213 OFFICE O/P EST LOW 20 MIN: CPT | Performed by: PHYSICIAN ASSISTANT

## 2019-05-09 ASSESSMENT — ENCOUNTER SYMPTOMS
DIARRHEA: 0
SORE THROAT: 0
PALPITATIONS: 0
ABDOMINAL PAIN: 0
WHEEZING: 0
MYALGIAS: 0
NAUSEA: 0
FEVER: 0
CHILLS: 0
EYE REDNESS: 0
SHORTNESS OF BREATH: 0
BLURRED VISION: 0
SINUS PAIN: 1
VOMITING: 0
COUGH: 0
HEADACHES: 0
EYE DISCHARGE: 0

## 2019-05-09 ASSESSMENT — PAIN SCALES - GENERAL: PAINLEVEL: EXTREME PAIN (8)

## 2019-05-09 ASSESSMENT — MIFFLIN-ST. JEOR: SCORE: 1401.12

## 2019-05-09 NOTE — PROGRESS NOTES
SUBJECTIVE:   Omayra Cadena is a 34 year old female who presents to clinic today for the following   health issues:        Acute Illness   Acute illness concerns: ear, head and neck pain  Onset: 5/3/2019    Fever: no    Chills/Sweats: no    Headache (location?): YES- sinus and behind eyes    Sinus Pressure:YES    Conjunctivitis:  no    Ear Pain: YES: both    Rhinorrhea: no    Congestion: YES    Sore Throat: no      Cough: no    Wheeze: no    Decreased Appetite: no    Nausea: no    Vomiting: no    Diarrhea:  no    Dysuria/Freq.: no    Fatigue/Achiness: YES- stiff neck    Sick/Strep Exposure: no     Therapies Tried and outcome: 3 AM IBU, mucinex was not helpful  Daughter same sx treated with Amoxicillin and got better      Additional history: as documented    Reviewed  and updated as needed this visit by clinical staff  Tobacco  Allergies  Meds  Problems  Med Hx  Surg Hx  Fam Hx         Reviewed and updated as needed this visit by Provider  Tobacco  Allergies  Meds  Problems  Med Hx  Surg Hx  Fam Hx         Patient Active Problem List   Diagnosis     Intractable migraine without aura and with status migrainosus     Generalized anxiety disorder     History reviewed. No pertinent surgical history.    Social History     Tobacco Use     Smoking status: Former Smoker     Types: Other     Smokeless tobacco: Never Used     Tobacco comment: e-cig   Substance Use Topics     Alcohol use: Yes     Comment: once every 6 months     History reviewed. No pertinent family history.      Current Outpatient Medications   Medication Sig Dispense Refill     amoxicillin-clavulanate (AUGMENTIN) 875-125 MG tablet Take 1 tablet by mouth 2 times daily for 10 days 20 tablet 0     DULoxetine (CYMBALTA) 20 MG capsule Take 1 capsule (20 mg) by mouth 2 times daily 60 capsule 1     No Known Allergies  Labs reviewed in EPIC    ROS:  Review of Systems   Constitutional: Negative for chills, fever and malaise/fatigue.   HENT:  "Positive for congestion, ear pain and sinus pain. Negative for sore throat.    Eyes: Negative for blurred vision, discharge and redness.   Respiratory: Negative for cough, shortness of breath and wheezing.    Cardiovascular: Negative for chest pain and palpitations.   Gastrointestinal: Negative for abdominal pain, diarrhea, nausea and vomiting.   Musculoskeletal: Negative for joint pain and myalgias.   Skin: Negative for rash.   Neurological: Negative for headaches.         OBJECTIVE:     /60   Pulse 90   Temp 97.9  F (36.6  C)   Resp 18   Ht 1.607 m (5' 3.25\")   Wt 72.3 kg (159 lb 6.4 oz)   LMP 04/27/2019   SpO2 97%   Breastfeeding? No   BMI 28.01 kg/m    Body mass index is 28.01 kg/m .    Physical Exam   Constitutional: She appears well-developed and well-nourished.   HENT:   Head: Normocephalic.   Right Ear: Ear canal normal. Tympanic membrane is injected and bulging.   Left Ear: Tympanic membrane and ear canal normal.   Mouth/Throat: Oropharynx is clear and moist.   Eyes: Pupils are equal, round, and reactive to light. Conjunctivae are normal.   Cardiovascular: Normal rate and normal heart sounds.   Pulmonary/Chest: Effort normal and breath sounds normal.   Skin: Skin is warm and dry. No rash noted.   Psychiatric: She has a normal mood and affect. Her behavior is normal.         Diagnostic Test Results:  none     ASSESSMENT/PLAN:     1. Acute suppurative otitis media of left ear without spontaneous rupture of tympanic membrane, recurrence not specified  Will treat with Augmentin twice daily x 10 days. Get plenty of rest and push fluids. Continue with supportive care. Follow up as needed.     - amoxicillin-clavulanate (AUGMENTIN) 875-125 MG tablet; Take 1 tablet by mouth 2 times daily for 10 days  Dispense: 20 tablet; Refill: 0    2. Acute maxillary sinusitis, recurrence not specified  As above     - amoxicillin-clavulanate (AUGMENTIN) 875-125 MG tablet; Take 1 tablet by mouth 2 times daily for 10 " days  Dispense: 20 tablet; Refill: 0     SUDHA Spence SAME DAY PROVIDER  [facility]

## 2019-05-09 NOTE — NURSING NOTE
"Chief Complaint   Patient presents with     Ear Problem       Initial Breastfeeding? No  Estimated body mass index is 28.27 kg/m  as calculated from the following:    Height as of 1/23/19: 1.6 m (5' 3\").    Weight as of 5/5/19: 72.4 kg (159 lb 9.6 oz).    Patient presents to the clinic using No DME    Health Maintenance that is potentially due pending provider review:  NONE    n/a    Is there anyone who you would like to be able to receive your results? not asked  If yes have patient fill out LEANDRO    "

## 2019-05-14 ENCOUNTER — TELEPHONE (OUTPATIENT)
Dept: FAMILY MEDICINE | Facility: CLINIC | Age: 34
End: 2019-05-14

## 2019-05-14 NOTE — TELEPHONE ENCOUNTER
Patient was seen last Thursday for an ear infection and sinus infection. She states the stiffness went away after she started the antibiotics but now it is back. She is wondering when she should expect the pressure to go away or if she should be seen again. Please advise.    Larissa P[facility]

## 2019-05-14 NOTE — TELEPHONE ENCOUNTER
Has appointment with allergist alexi, she will have her look in her ear and discuss steroid nasal spray for her symptoms.

## 2019-05-22 DIAGNOSIS — F41.1 GENERALIZED ANXIETY DISORDER: ICD-10-CM

## 2019-05-22 RX ORDER — DULOXETIN HYDROCHLORIDE 60 MG/1
60 CAPSULE, DELAYED RELEASE ORAL DAILY
Qty: 30 CAPSULE | Refills: 2 | Status: SHIPPED | OUTPATIENT
Start: 2019-05-22 | End: 2019-08-26

## 2019-05-22 RX ORDER — DULOXETIN HYDROCHLORIDE 20 MG/1
20 CAPSULE, DELAYED RELEASE ORAL 2 TIMES DAILY
Qty: 60 CAPSULE | Refills: 1 | Status: CANCELLED | OUTPATIENT
Start: 2019-05-22

## 2019-05-22 NOTE — TELEPHONE ENCOUNTER
Happy to hear she's benefiting from Cymbalta. If she's not having any issues with 40 mg daily, yes, we can increase this to 60 mg daily (1 cap daily). I'll send in a prescription for 1 month x 2 refills, and the patient can follow-up with her PCP sometime in the next several months.

## 2019-05-22 NOTE — TELEPHONE ENCOUNTER
Reason for Call:  Other prescription    Detailed comments: pt states that she ran out of her medication unknowingly and hasn't taken any in two days.   Pt also reports that she feels like she is doing well on Cymbalta, but could benefit from an increase.   She would like an e-visit or to be sent back to her PCP for on-going management as she lives far.     Phone Number Patient can be reached at: Home number on file [phone] (home)    Best Time: anytime    Can we leave a detailed message on this number? YES    Call taken on 5/22/2019 at 2:26 PM by Yolanda Hinojosa

## 2019-05-22 NOTE — TELEPHONE ENCOUNTER
Spoke with patient and relayed message and plan from provider. Patient agreed and was quite thankful.

## 2019-05-22 NOTE — TELEPHONE ENCOUNTER
Patient is on Cymbalta 40 mg  Last ov 3/12/19. Next ov not scheduled.   Patient reports distance to clinic is a barrier. She requested an evist. Will route to provider.

## 2019-05-22 NOTE — TELEPHONE ENCOUNTER
"Requested Prescriptions   Pending Prescriptions Disp Refills     DULoxetine (CYMBALTA) 20 MG capsule 60 capsule 1     Sig: Take 1 capsule (20 mg) by mouth 2 times daily    Last Written Prescription Date:  3/12/19  Last Fill Quantity: 60 capsules,  # refills: 1   Last office visit: 3/12/2019 with prescribing provider:  Latricia   Future Office Visit:           Serotonin-Norepinephrine Reuptake Inhibitors  Passed - 5/22/2019  1:44 PM        Passed - Blood pressure under 140/90 in past 12 months     BP Readings from Last 3 Encounters:   05/09/19 116/60   05/05/19 100/69   03/12/19 104/64                 Passed - Recent (12 mo) or future (30 days) visit within the authorizing provider's specialty     Patient had office visit in the last 12 months or has a visit in the next 30 days with authorizing provider or within the authorizing provider's specialty.  See \"Patient Info\" tab in inbasket, or \"Choose Columns\" in Meds & Orders section of the refill encounter.              Passed - Medication is active on med list        Passed - Patient is age 18 or older        Passed - No active pregnancy on record        Passed - No positive pregnancy test in past 12 months          "

## 2019-06-07 ENCOUNTER — NURSE TRIAGE (OUTPATIENT)
Dept: NURSING | Facility: CLINIC | Age: 34
End: 2019-06-07

## 2019-06-07 NOTE — TELEPHONE ENCOUNTER
Patient is wondering if the increase in her cymbalta dose a few weeks ago could be giving her a slightly increased heart rate. She said her HR has always been in the 70's until the last few weeks, now 90's up to 120. She is also having significant issues were her environmental allergies and has been getting shots every 14 days.When asked about her PO fluid intake she said she has not really increased it with the hot weather. She states she will try that and if she notes that her HR is still on the high side she will discuss with her PCP. Advised to call [facility] for other questions or concerns as needed.  Lin Ceja RN  [facility]

## 2019-06-11 ENCOUNTER — TELEPHONE (OUTPATIENT)
Dept: FAMILY MEDICINE | Facility: CLINIC | Age: 34
End: 2019-06-11

## 2019-08-06 ENCOUNTER — OFFICE VISIT (OUTPATIENT)
Dept: FAMILY MEDICINE | Facility: CLINIC | Age: 34
End: 2019-08-06
Payer: COMMERCIAL

## 2019-08-06 VITALS
HEIGHT: 63 IN | HEART RATE: 80 BPM | RESPIRATION RATE: 12 BRPM | TEMPERATURE: 96.8 F | SYSTOLIC BLOOD PRESSURE: 100 MMHG | DIASTOLIC BLOOD PRESSURE: 70 MMHG | WEIGHT: 163.2 LBS | OXYGEN SATURATION: 97 % | BODY MASS INDEX: 28.92 KG/M2

## 2019-08-06 DIAGNOSIS — G43.911 INTRACTABLE MIGRAINE WITH STATUS MIGRAINOSUS, UNSPECIFIED MIGRAINE TYPE: Primary | ICD-10-CM

## 2019-08-06 DIAGNOSIS — R61 NIGHT SWEATS: ICD-10-CM

## 2019-08-06 LAB
ALBUMIN SERPL-MCNC: 3.3 G/DL (ref 3.4–5)
ALP SERPL-CCNC: 49 U/L (ref 40–150)
ALT SERPL W P-5'-P-CCNC: 28 U/L (ref 0–50)
ANION GAP SERPL CALCULATED.3IONS-SCNC: 4 MMOL/L (ref 3–14)
AST SERPL W P-5'-P-CCNC: 19 U/L (ref 0–45)
BASOPHILS # BLD AUTO: 0 10E9/L (ref 0–0.2)
BASOPHILS NFR BLD AUTO: 0.1 %
BILIRUB SERPL-MCNC: 0.2 MG/DL (ref 0.2–1.3)
BUN SERPL-MCNC: 11 MG/DL (ref 7–30)
CALCIUM SERPL-MCNC: 8.1 MG/DL (ref 8.5–10.1)
CHLORIDE SERPL-SCNC: 106 MMOL/L (ref 94–109)
CO2 SERPL-SCNC: 28 MMOL/L (ref 20–32)
CREAT SERPL-MCNC: 0.8 MG/DL (ref 0.52–1.04)
DIFFERENTIAL METHOD BLD: ABNORMAL
EOSINOPHIL # BLD AUTO: 0.2 10E9/L (ref 0–0.7)
EOSINOPHIL NFR BLD AUTO: 3.1 %
ERYTHROCYTE [DISTWIDTH] IN BLOOD BY AUTOMATED COUNT: 13.9 % (ref 10–15)
ESTRADIOL SERPL-MCNC: 197 PG/ML
FSH SERPL-ACNC: 3.5 IU/L
GFR SERPL CREATININE-BSD FRML MDRD: >90 ML/MIN/{1.73_M2}
GLUCOSE SERPL-MCNC: 91 MG/DL (ref 70–99)
HCT VFR BLD AUTO: 41.1 % (ref 35–47)
HGB BLD-MCNC: 12.9 G/DL (ref 11.7–15.7)
INSULIN SERPL-ACNC: 9.1 MU/L (ref 3–25)
LYMPHOCYTES # BLD AUTO: 1.8 10E9/L (ref 0.8–5.3)
LYMPHOCYTES NFR BLD AUTO: 26.6 %
MCH RBC QN AUTO: 28.7 PG (ref 26.5–33)
MCHC RBC AUTO-ENTMCNC: 31.4 G/DL (ref 31.5–36.5)
MCV RBC AUTO: 92 FL (ref 78–100)
MONOCYTES # BLD AUTO: 0.5 10E9/L (ref 0–1.3)
MONOCYTES NFR BLD AUTO: 6.7 %
NEUTROPHILS # BLD AUTO: 4.3 10E9/L (ref 1.6–8.3)
NEUTROPHILS NFR BLD AUTO: 63.5 %
PLATELET # BLD AUTO: 261 10E9/L (ref 150–450)
POTASSIUM SERPL-SCNC: 4.3 MMOL/L (ref 3.4–5.3)
PROT SERPL-MCNC: 6 G/DL (ref 6.8–8.8)
RBC # BLD AUTO: 4.49 10E12/L (ref 3.8–5.2)
SODIUM SERPL-SCNC: 138 MMOL/L (ref 133–144)
TSH SERPL DL<=0.005 MIU/L-ACNC: 1.25 MU/L (ref 0.4–4)
WBC # BLD AUTO: 6.7 10E9/L (ref 4–11)

## 2019-08-06 PROCEDURE — 85025 COMPLETE CBC W/AUTO DIFF WBC: CPT | Performed by: NURSE PRACTITIONER

## 2019-08-06 PROCEDURE — 36415 COLL VENOUS BLD VENIPUNCTURE: CPT | Performed by: NURSE PRACTITIONER

## 2019-08-06 PROCEDURE — 83525 ASSAY OF INSULIN: CPT | Performed by: NURSE PRACTITIONER

## 2019-08-06 PROCEDURE — 99214 OFFICE O/P EST MOD 30 MIN: CPT | Performed by: NURSE PRACTITIONER

## 2019-08-06 PROCEDURE — 80053 COMPREHEN METABOLIC PANEL: CPT | Performed by: NURSE PRACTITIONER

## 2019-08-06 PROCEDURE — 83001 ASSAY OF GONADOTROPIN (FSH): CPT | Performed by: NURSE PRACTITIONER

## 2019-08-06 PROCEDURE — 82670 ASSAY OF TOTAL ESTRADIOL: CPT | Performed by: NURSE PRACTITIONER

## 2019-08-06 PROCEDURE — 84443 ASSAY THYROID STIM HORMONE: CPT | Performed by: NURSE PRACTITIONER

## 2019-08-06 RX ORDER — SUMATRIPTAN 25 MG/1
25 TABLET, FILM COATED ORAL
COMMUNITY
End: 2020-05-15

## 2019-08-06 ASSESSMENT — ANXIETY QUESTIONNAIRES
3. WORRYING TOO MUCH ABOUT DIFFERENT THINGS: NOT AT ALL
4. TROUBLE RELAXING: NOT AT ALL
1. FEELING NERVOUS, ANXIOUS, OR ON EDGE: NOT AT ALL
GAD7 TOTAL SCORE: 2
2. NOT BEING ABLE TO STOP OR CONTROL WORRYING: NOT AT ALL
7. FEELING AFRAID AS IF SOMETHING AWFUL MIGHT HAPPEN: NOT AT ALL
7. FEELING AFRAID AS IF SOMETHING AWFUL MIGHT HAPPEN: NOT AT ALL
GAD7 TOTAL SCORE: 2
6. BECOMING EASILY ANNOYED OR IRRITABLE: MORE THAN HALF THE DAYS
GAD7 TOTAL SCORE: 2
5. BEING SO RESTLESS THAT IT IS HARD TO SIT STILL: NOT AT ALL

## 2019-08-06 ASSESSMENT — ENCOUNTER SYMPTOMS
HEADACHES: 1
DIAPHORESIS: 1

## 2019-08-06 ASSESSMENT — PATIENT HEALTH QUESTIONNAIRE - PHQ9
10. IF YOU CHECKED OFF ANY PROBLEMS, HOW DIFFICULT HAVE THESE PROBLEMS MADE IT FOR YOU TO DO YOUR WORK, TAKE CARE OF THINGS AT HOME, OR GET ALONG WITH OTHER PEOPLE: SOMEWHAT DIFFICULT
SUM OF ALL RESPONSES TO PHQ QUESTIONS 1-9: 7
SUM OF ALL RESPONSES TO PHQ QUESTIONS 1-9: 7

## 2019-08-06 ASSESSMENT — MIFFLIN-ST. JEOR: SCORE: 1418.36

## 2019-08-06 NOTE — PROGRESS NOTES
"Subjective     Omayra Cadena is a 34 year old female who presents to clinic today for the following health issues:  Chief Complaint   Patient presents with     Headache     x 1 month     Perspiration     x 1 month     Weight Problem     5-10lbs in 1 month         Headache      Perspiration   Associated symptoms include diaphoresis and headaches.      HA, Night sweats and weight gain      Duration: 1 month    Description (location/character/radiation): Headaches between 12-3pm. Start out as pain behind the eyes and if she doesn't take anything it turns into stabbing pain. She has Sumatriptan and she states that this makes her drowsy so she does not take unless it is at night or her  is home. Night sweats - wakes up every 2 hours and she states it is \"like I jumped into a pool and got back into bed.\" Weight gain she states that she has gained 5-10lbs in the last month. She denies changes in her eating habits. 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     Intensity:  moderate    Accompanying signs and symptoms: Night sweats, HA and weight gain.    History (similar episodes/previous evaluation): None    Precipitating or alleviating factors: None    Therapies tried and outcome: Sumatriptan for HA. She has also tried Flexeril - this also caused drowsiness. She has tried chiropractor and Excedrin as well.          Patient Active Problem List   Diagnosis     Intractable migraine without aura and with status migrainosus     Generalized anxiety disorder     History reviewed. No pertinent surgical history.    Social History     Tobacco Use     Smoking status: Former Smoker     Types: Other     Smokeless tobacco: Never Used     Tobacco comment: e-cig   Substance Use Topics     Alcohol use: Yes     Comment: once every 6 months     History reviewed. No pertinent family history.      Current Outpatient Medications   Medication Sig Dispense " "Refill     amitriptyline (ELAVIL) 25 MG tablet Take 1 tablet (25 mg) by mouth At Bedtime 30 tablet 1     DULoxetine (CYMBALTA) 60 MG capsule Take 1 capsule (60 mg) by mouth daily 30 capsule 2     SUMAtriptan (IMITREX) 25 MG tablet Take 25 mg by mouth at onset of headache for migraine (as needed)       No Known Allergies  No lab results found.   BP Readings from Last 3 Encounters:   08/06/19 100/70   05/09/19 116/60   05/05/19 100/69    Wt Readings from Last 3 Encounters:   08/06/19 74 kg (163 lb 3.2 oz)   05/09/19 72.3 kg (159 lb 6.4 oz)   05/05/19 72.4 kg (159 lb 9.6 oz)                 Reviewed and updated as needed this visit by Provider         Review of Systems   Constitutional: Positive for diaphoresis.   Neurological: Positive for headaches.      ROS COMP: Constitutional, HEENT, cardiovascular, pulmonary, gi and gu systems are negative, except as otherwise noted.      Objective    /70 (BP Location: Right arm, Patient Position: Sitting, Cuff Size: Adult Regular)   Pulse 80   Temp 96.8  F (36  C) (Tympanic)   Resp 12   Ht 1.607 m (5' 3.25\")   Wt 74 kg (163 lb 3.2 oz)   SpO2 97%   BMI 28.68 kg/m    Body mass index is 28.68 kg/m .  Physical Exam   GENERAL: healthy, alert and no distress  EYES: Eyes grossly normal to inspection, PERRL and conjunctivae and sclerae normal  HENT: ear canals and TM's normal, nose and mouth without ulcers or lesions  NECK: no adenopathy, no asymmetry, masses, or scars and thyroid normal to palpation  RESP: lungs clear to auscultation - no rales, rhonchi or wheezes  CV: regular rate and rhythm, normal S1 S2, no S3 or S4, no murmur, click or rub, no peripheral edema and peripheral pulses strong  ABDOMEN: soft, nontender, no hepatosplenomegaly, no masses and bowel sounds normal  MS: no gross musculoskeletal defects noted, no edema  SKIN: no suspicious lesions or rashes  NEURO: Normal strength and tone, mentation intact and speech normal  PSYCH: mentation appears normal, " "affect normal/bright    Results for orders placed or performed in visit on 08/06/19   CBC with platelets differential   Result Value Ref Range    WBC 6.7 4.0 - 11.0 10e9/L    RBC Count 4.49 3.8 - 5.2 10e12/L    Hemoglobin 12.9 11.7 - 15.7 g/dL    Hematocrit 41.1 35.0 - 47.0 %    MCV 92 78 - 100 fl    MCH 28.7 26.5 - 33.0 pg    MCHC 31.4 (L) 31.5 - 36.5 g/dL    RDW 13.9 10.0 - 15.0 %    Platelet Count 261 150 - 450 10e9/L    % Neutrophils 63.5 %    % Lymphocytes 26.6 %    % Monocytes 6.7 %    % Eosinophils 3.1 %    % Basophils 0.1 %    Absolute Neutrophil 4.3 1.6 - 8.3 10e9/L    Absolute Lymphocytes 1.8 0.8 - 5.3 10e9/L    Absolute Monocytes 0.5 0.0 - 1.3 10e9/L    Absolute Eosinophils 0.2 0.0 - 0.7 10e9/L    Absolute Basophils 0.0 0.0 - 0.2 10e9/L    Diff Method Automated Method              Assessment & Plan     (G43.911) Intractable migraine with status migrainosus, unspecified migraine type  (primary encounter diagnosis)  Comment: Patient will be started on amitriptyline for prevention of headaches  Plan: amitriptyline (ELAVIL) 25 MG tablet      (R61) Night sweats  Comment: *We will obtain further testing for night sweats.  Will have patient continue with Cymbalta as she is been on it with all the symptoms and Cymbalta has been very effective for her.  We will do further work-up.  Will determine what next steps are based on lab results  Plan: CBC with platelets differential, Comprehensive         metabolic panel, TSH with free T4 reflex,         Insulin level, Follicle stimulating hormone,         Estradiol         BMI:   Estimated body mass index is 28.68 kg/m  as calculated from the following:    Height as of this encounter: 1.607 m (5' 3.25\").    Weight as of this encounter: 74 kg (163 lb 3.2 oz).   Weight management plan: Discussed healthy diet and exercise guidelines        See Patient Instructions    Return in about 1 week (around 8/13/2019), or if symptoms worsen or fail to improve.    Krysta Webster, " ANIBAL [facility]        Answers for HPI/ROS submitted by the patient on 8/6/2019   If you checked off any problems, how difficult have these problems made it for you to do your work, take care of things at home, or get along with other people?: Somewhat difficult  PHQ9 TOTAL SCORE: 7  ABDI 7 TOTAL SCORE: 2

## 2019-08-06 NOTE — PATIENT INSTRUCTIONS
Patient Education     About Migraine Headaches  What is a migraine headache?  A migraine is a very painful type of headache. It may last a few hours or days. During a migraine, you may have vision problems and feel sick to your stomach.  Migraines are three times more common in women than in men. Once they start, you may get them for the rest of your life. They may occur less often as you age.  What causes it?  We don't know the exact cause, but many things can trigger a migraine. These include:    Stress and anxiety    Lack of food or sleep    Foods and drinks that contain tyramine, such as:  ? Red rey and some beers  ? Aged cheeses (like cheddar or blue cheese)  ? Yeast  ? Aged, dried or cured meats  ? Fermented foods like sauerkraut, soy sauce, miso and deloris chi    Too much light    Chemicals (gasoline, cleaning products, perfume, glue, etc.)    Weather changes    Certain medicines    Hormone changes (in women).  What are symptoms?  Some people can tell when they're about to have a migraine. They may see flashing lights or zigzag lines in front of their eyes. Or they may lose their vision for a short time.  With a migraine, you may:    Feel pain or pulsing on one side of the head. For some people, the entire head hurts.    Be very sensitive to light and sound.    Feel nauseated (sick to your stomach) and vomit (throw up).  How is it treated?  Your care team may suggest medicine to prevent or relieve your symptoms. Once you start having migraines, you may also need medicine to keep them from getting worse.   Take your medicine at the first sign of a migraine. It may take several tries to find a medicine that works for you.   When a migraine comes:    Lie down in a quiet, dark room. Try not to bend over, as this may cause more pain.    Put a cold pack on your head. Try a bag of frozen vegetables, wrapped with a thin cloth.    Drink extra fluids. If you can't drink, suck on ice chips.  How can I prevent  migraines?  It will help to keep a migraine diary. By keeping a diary, you may find the cause of your headaches. Once you know the cause, you can take steps to prevent migraines in the future.  It also helps to live a healthy lifestyle. This means:    Get regular exercise. (If exercise triggers your headaches, tell your doctor.)    Drink plenty of water.    Eat healthy meals at regular times.    Try to go to bed and get up and regular times.    Don't smoke. Avoid second-hand smoke.    Avoid caffeine. Coffee, tea and soda may help a migraine. But if you drink them too often, they can cause migraines.    Find ways to relax, have fun and reduce stress in your life.    Try complementary therapies (yoga, acupuncture, massage, biofeedback, etc.).  When should I call my clinic?  Call your clinic at once if you have new or unusual symptoms, such as:     Pain that gets worse or lasts more than 24 hours.    Slurred speech or problems talking.    A weak arm or leg that you can't move normally.    A fever over 100 F (37.8 C), taken under the tongue.    Stiff neck.    Vomiting (throwing up) for several hours.  For more information about migraines  Contact the [employer] [place] [employer] Education (ACHE) at 1-781.388.9927 or www.headaches.org.  Local providers of complementary therapies  These services may not be covered by insurance. Check your insurance plan.  [facility]  [phone]   Includes pain education, behavioral therapy,   trigger point injections and more.  [facility]   [phone]   Includes acupuncture, massage, myofascial release.  [facility] at the [facility]  [phone]  [email or web address]  Includes mindfulness, meditation, yoga.  Community Education     [place]: [email or web address].mpls.[email or web address].mn.    [employer]: commedprograms.spps.org  Look for programs on yoga, mindfulness, etc.  [facility]    [phone]  www.pathways[place].org  Includes mindfulness, yoga, body-mind skills.  For informational purposes only. Not to replace the advice of your health care provider.   Copyright   2011 [employer]. All rights reserved. "[employer] 208527 - 11/15.  For informational purposes only. Not to replace the advice of your health care provider.  Copyright   2018 [employer]. All rights reserved.

## 2019-08-07 ASSESSMENT — PATIENT HEALTH QUESTIONNAIRE - PHQ9: SUM OF ALL RESPONSES TO PHQ QUESTIONS 1-9: 7

## 2019-08-07 ASSESSMENT — ANXIETY QUESTIONNAIRES: GAD7 TOTAL SCORE: 2

## 2019-08-07 NOTE — RESULT ENCOUNTER NOTE
Please Notify Omayra  of test results estrogen levels within normal limits and your FSH is within normal limits indicating no menopausal stage.  Thyroid was within normal limits.  Your comprehensive panel was within normal limits.  In working with the night sweats does not seem to be hormonally based.  May be related to Cymbalta.  Her options are switching off the Cymbalta or we could try doing low-dose Paxil to see if it controls the symptoms of the sweats.  Please let me know what your thoughts are if you want to monitor it for the next couple of weeks and see where things go and we can determine if it self resolves if not our options again are switching off the Cymbalta or adding a low-dose Paxil to see if that controls some of the nighttime sweats.    Krysta Webster CNP

## 2019-08-26 ENCOUNTER — TELEPHONE (OUTPATIENT)
Dept: FAMILY MEDICINE | Facility: CLINIC | Age: 34
End: 2019-08-26

## 2019-08-26 DIAGNOSIS — F41.1 GENERALIZED ANXIETY DISORDER: ICD-10-CM

## 2019-08-26 RX ORDER — DULOXETIN HYDROCHLORIDE 60 MG/1
60 CAPSULE, DELAYED RELEASE ORAL DAILY
Qty: 30 CAPSULE | Refills: 0 | Status: SHIPPED | OUTPATIENT
Start: 2019-08-26 | End: 2019-08-29

## 2019-08-26 NOTE — TELEPHONE ENCOUNTER
Reason for Call:  Other     Detailed comments: Patient is not liking the amitriptyline and would like to try a different medication - please call patient to talk about other RX's she could try - And patient is wondering if Krysta could take over prescribing her Cymbalta?    Phone Number Patient can be reached at: Home number on file [phone] (home)    Best Time:     Can we leave a detailed message on this number? YES    Call taken on 8/26/2019 at 11:14 AM by Rosa Maria Mc

## 2019-08-26 NOTE — TELEPHONE ENCOUNTER
Reason for Call:  Medication or medication refill:    Do you use a [facility]?  Name of the pharmacy and phone number for the current request:  [facility]    Name of the medication requested: DULoxetine (CYMBALTA) 60 MG capsule    Other request: pt states ran out yesterday,    Can we leave a detailed message on this number? YES    Phone number patient can be reached at: Home number on file [phone] (home)    Best Time: asap    Call taken on 8/26/2019 at 11:23 AM by Trinidad Dela Cruz

## 2019-08-26 NOTE — TELEPHONE ENCOUNTER
Medication Cymbalta 60 mg  is being filled for 1 time refill only due to:  in process of trying to reach her PCP Krysta Webster for future efills. (refills)  Just saw her 8/6/19.  Waiting to hear back but in the meantime nurse went ahead and gave a one month fill.    Patient aware.   Damaris Dalton RN

## 2019-08-26 NOTE — TELEPHONE ENCOUNTER
Please have patient make an appointment to review this information.  We will look at seeing what we can do with her Cymbalta and her amitriptyline.    Krysta Webster CNP

## 2019-08-29 ENCOUNTER — VIRTUAL VISIT (OUTPATIENT)
Dept: FAMILY MEDICINE | Facility: CLINIC | Age: 34
End: 2019-08-29
Payer: COMMERCIAL

## 2019-08-29 DIAGNOSIS — F41.1 GENERALIZED ANXIETY DISORDER: Primary | ICD-10-CM

## 2019-08-29 DIAGNOSIS — R51.9 HEADACHE DISORDER: ICD-10-CM

## 2019-08-29 PROCEDURE — 99443 ZZC PHYSICIAN TELEPHONE EVALUATION 21-30 MIN: CPT | Performed by: NURSE PRACTITIONER

## 2019-08-29 RX ORDER — DULOXETIN HYDROCHLORIDE 60 MG/1
60 CAPSULE, DELAYED RELEASE ORAL DAILY
Qty: 90 CAPSULE | Refills: 1 | Status: SHIPPED | OUTPATIENT
Start: 2019-08-29 | End: 2020-03-25

## 2019-08-29 ASSESSMENT — ANXIETY QUESTIONNAIRES
2. NOT BEING ABLE TO STOP OR CONTROL WORRYING: NOT AT ALL
7. FEELING AFRAID AS IF SOMETHING AWFUL MIGHT HAPPEN: NOT AT ALL
GAD7 TOTAL SCORE: 1
6. BECOMING EASILY ANNOYED OR IRRITABLE: SEVERAL DAYS
5. BEING SO RESTLESS THAT IT IS HARD TO SIT STILL: NOT AT ALL
1. FEELING NERVOUS, ANXIOUS, OR ON EDGE: NOT AT ALL
3. WORRYING TOO MUCH ABOUT DIFFERENT THINGS: NOT AT ALL

## 2019-08-29 ASSESSMENT — PATIENT HEALTH QUESTIONNAIRE - PHQ9
SUM OF ALL RESPONSES TO PHQ QUESTIONS 1-9: 3
5. POOR APPETITE OR OVEREATING: NOT AT ALL

## 2019-08-29 NOTE — PROGRESS NOTES
"Omayra Cadena is a 34 year old female who is being evaluated via a billable telephone visit.      The patient has been notified of following:     \"This telephone visit will be conducted via a call between you and your physician/provider. We have found that certain health care needs can be provided without the need for a physical exam.  This service lets us provide the care you need with a short phone conversation.  If a prescription is necessary we can send it directly to your pharmacy.  If lab work is needed we can place an order for that and you can then stop by our lab to have the test done at a later time.    If during the course of the call the physician/provider feels a telephone visit is not appropriate, you will not be charged for this service.\"     Consent has been obtained for this service by 1 care team member: yes. See the scanned image in the medical record.    Omayra Cadena complains of    Chief Complaint   Patient presents with     Depression       I have reviewed and updated the patient's Past Medical History, Social History, Family History and Medication List.    ALLERGIES  Patient has no known allergies.    Vilma Wolfe CMA (MA signature)    Depression Followup    How are you doing with your depression since your last visit? No change    Are you having other symptoms that might be associated with depression? No    Have you had a significant life event?  No     Are you feeling anxious or having panic attacks?   No    Do you have any concerns with your use of alcohol or other drugs? No     Headaches are still present in temp has had them daily last all night Imitrex has not helped has used Excedrin which has been helpful   Has been on  Amitriptyline but  has not been consistent with use and it has not been helpful.    Social History     Tobacco Use     Smoking status: Former Smoker     Types: Other     Smokeless tobacco: Never Used     Tobacco comment: e-cig   Substance Use Topics     " Alcohol use: Yes     Comment: once every 6 months     Drug use: No     PHQ 3/12/2019 8/6/2019 8/29/2019   PHQ-9 Total Score 12 7 3   Q9: Thoughts of better off dead/self-harm past 2 weeks Not at all Not at all Not at all     ABDI-7 SCORE 3/12/2019 8/6/2019 8/29/2019   Total Score 15 (severe anxiety) 2 (minimal anxiety) -   Total Score 15 2 1     No flowsheet data found.  No flowsheet data found.  In the past two weeks have you had thoughts of suicide or self-harm?  No.    Do you have concerns about your personal safety or the safety of others?   No    Suicide Assessment Five-step Evaluation and Treatment (SAFE-T)    Assessment/Plan:  (F41.1) Generalized anxiety disorder  (primary encounter diagnosis)  Comment:.  Patient will patient is seen by psych and started on Cymbalta but she is found to be very helpful continue with Cymbalta did renew for 6 months.  Night sweats have been manageable no further concerns  Plan: DULoxetine (CYMBALTA) 60 MG capsule      (R51) Headache disorder  Comment: Patient has tension type headaches versus unspecified headaches.  Was started on amitriptyline 25 mg.  Patient has not been consistent taking amitriptyline is taken 1-2 times a week.  Do recommend trying to take amitriptyline daily and may increase up to 50 mg.  Due to no resolution of the headaches will have patient follow-up with neurology  Plan: NEUROLOGY ADULT REFERRAL              I have reviewed the note as documented above.  This accurately captures the substance of my conversation with the patient.    Total time of call between patient and provider was 25 minutes     ANIBAL Hancock CNP

## 2019-08-30 ASSESSMENT — ANXIETY QUESTIONNAIRES: GAD7 TOTAL SCORE: 1

## 2019-09-23 DIAGNOSIS — G43.911 INTRACTABLE MIGRAINE WITH STATUS MIGRAINOSUS, UNSPECIFIED MIGRAINE TYPE: ICD-10-CM

## 2019-09-24 NOTE — TELEPHONE ENCOUNTER
"Requested Prescriptions   Pending Prescriptions Disp Refills     amitriptyline (ELAVIL) 25 MG tablet [Pharmacy Med Name: AMITRIPTYLINE HCL 25MG TABS] 30 tablet 1     Sig: TAKE ONE TABLET BY MOUTH AT BEDTIME       Tricyclic Agents ( Annual appt and no PHQ9) Passed - 9/23/2019 10:23 PM        Passed - Blood Pressure under 140/90 in past 12 mos     BP Readings from Last 3 Encounters:   08/06/19 100/70   05/09/19 116/60   05/05/19 100/69                 Passed - Recent (12 mo) or future (30 days) visit within authorizing provider's specialty     Patient had office visit in the last 12 months or has a visit in the next 30 days with authorizing provider or within the authorizing provider's specialty.  See \"Patient Info\" tab in inbasket, or \"Choose Columns\" in Meds & Orders section of the refill encounter.              Passed - Medication is active on med list        Passed - Patient is age 18 or older        Passed - Patient is not pregnant        Passed - No positive pregnancy test on record in past 12 mos        Last Written Prescription Date:  8/6/19  Last Fill Quantity: 90,  # refills: 1   Last office visit: 8/29/2019 with prescribing provider:     Future Office Visit:      "

## 2020-01-14 ASSESSMENT — ANXIETY QUESTIONNAIRES
GAD7 TOTAL SCORE: 4
5. BEING SO RESTLESS THAT IT IS HARD TO SIT STILL: NOT AT ALL
3. WORRYING TOO MUCH ABOUT DIFFERENT THINGS: SEVERAL DAYS
7. FEELING AFRAID AS IF SOMETHING AWFUL MIGHT HAPPEN: NOT AT ALL
2. NOT BEING ABLE TO STOP OR CONTROL WORRYING: NOT AT ALL
GAD7 TOTAL SCORE: 4
6. BECOMING EASILY ANNOYED OR IRRITABLE: MORE THAN HALF THE DAYS
7. FEELING AFRAID AS IF SOMETHING AWFUL MIGHT HAPPEN: NOT AT ALL
1. FEELING NERVOUS, ANXIOUS, OR ON EDGE: SEVERAL DAYS
4. TROUBLE RELAXING: NOT AT ALL
GAD7 TOTAL SCORE: 4

## 2020-01-14 ASSESSMENT — PATIENT HEALTH QUESTIONNAIRE - PHQ9
SUM OF ALL RESPONSES TO PHQ QUESTIONS 1-9: 6
10. IF YOU CHECKED OFF ANY PROBLEMS, HOW DIFFICULT HAVE THESE PROBLEMS MADE IT FOR YOU TO DO YOUR WORK, TAKE CARE OF THINGS AT HOME, OR GET ALONG WITH OTHER PEOPLE: NOT DIFFICULT AT ALL
SUM OF ALL RESPONSES TO PHQ QUESTIONS 1-9: 6

## 2020-01-15 ENCOUNTER — OFFICE VISIT (OUTPATIENT)
Dept: FAMILY MEDICINE | Facility: CLINIC | Age: 35
End: 2020-01-15
Payer: COMMERCIAL

## 2020-01-15 VITALS
WEIGHT: 174 LBS | SYSTOLIC BLOOD PRESSURE: 128 MMHG | RESPIRATION RATE: 18 BRPM | BODY MASS INDEX: 30.83 KG/M2 | DIASTOLIC BLOOD PRESSURE: 70 MMHG | HEIGHT: 63 IN | HEART RATE: 76 BPM | TEMPERATURE: 98.2 F

## 2020-01-15 DIAGNOSIS — G43.001 MIGRAINE WITHOUT AURA AND WITH STATUS MIGRAINOSUS, NOT INTRACTABLE: Primary | ICD-10-CM

## 2020-01-15 DIAGNOSIS — F41.1 GENERALIZED ANXIETY DISORDER: ICD-10-CM

## 2020-01-15 PROCEDURE — 99214 OFFICE O/P EST MOD 30 MIN: CPT | Performed by: NURSE PRACTITIONER

## 2020-01-15 RX ORDER — SUMATRIPTAN 25 MG/1
25 TABLET, FILM COATED ORAL
Qty: 9 TABLET | Refills: 1 | Status: SHIPPED | OUTPATIENT
Start: 2020-01-15 | End: 2023-09-25

## 2020-01-15 RX ORDER — TOPIRAMATE 50 MG/1
TABLET, FILM COATED ORAL
Qty: 27 TABLET | Refills: 0 | Status: SHIPPED | OUTPATIENT
Start: 2020-01-15 | End: 2020-02-13

## 2020-01-15 ASSESSMENT — MIFFLIN-ST. JEOR: SCORE: 1467.35

## 2020-01-15 ASSESSMENT — PATIENT HEALTH QUESTIONNAIRE - PHQ9: SUM OF ALL RESPONSES TO PHQ QUESTIONS 1-9: 6

## 2020-01-15 ASSESSMENT — ANXIETY QUESTIONNAIRES: GAD7 TOTAL SCORE: 4

## 2020-01-15 NOTE — PATIENT INSTRUCTIONS
Patient Education     Migraine Headache  This often severe type of headache is different from other types of headaches in that symptoms other than pain occur with the headache. Nausea and vomiting, lightheadedness, sensitivity to light (photophobia), and other visual disturbances are common migraine symptoms. The pain may last from a few hours to several days. It is not clear why migraines occur but certain factors called  triggers  can raise the risk of having a migraine attack. A migraine may be triggered by emotional stress or depression, or by hormone changes during the menstrual cycle. Other triggers include birth control pills, overuse of migraine medicines, alcohol or caffeine, foods with tyramine (such as aged cheese and wine), eyestrain, weather changes, missed meals, or too little or too much sleep.  Home care  Follow these tips when taking care of yourself at home:    Don t drive yourself home if you were given pain medicine for your headache or are having visual symptoms. Instead, have someone else drive you home. Try to sleep when you get home. You should feel much better when you wake up.    Cold can help ease migraine symptoms. Put an ice pack on your forehead or at the base of your skull. Put heat on the back of your neck to help ease any neck spasm.    Drink only clear liquids or eat a light diet until your symptoms get better. This will help you avoid nausea and vomiting.  How to prevent migraines  Pay attention to what seems to trigger your headache. Try to avoid the triggers when you can. If you have frequent headaches, consider keeping a headache diary. In it, write down what you were doing, feeling, or eating in the hours before each headache. Show this to your healthcare provider to help find the cause of your headaches.  If stress seems to be a trigger for your headaches, figure out what is causing stress in your life. Learn new ways to handle your stress. Ideas include regular exercise,  biofeedback, self-hypnosis, yoga, and meditation. Talk with your healthcare provider to find out more information about managing stress. Many books and digital media are also available on this subject.  Tyramine is a substance found in many foods. It can trigger a migraine in some people. These foods contain tyramine:    Chocolate    Yogurt    All cheeses, but especially aged cheeses    Smoked or pickled fish and meat, including herring, caviar, bologna, pepperoni, and salami    Liver    Avocados    Bananas    Figs    Raisins    Red wine  Try staying away from these foods for 1 to 2 months to see if you have fewer headaches.  How to treat future headaches    Take time out at the first sign of a headache, if possible. Find a quiet, dark, comfortable place to sit or lie down. Let yourself relax or sleep.    Put an ice pack on your forehead or on the area of greatest pain. A heating pad and massage may help if you are having a muscle spasm and tightness in your neck.    If you have been prescribed a medicine to stop a migraine headache, use this at the first warning sign of the headache for best results. First signs may be an aura or pain.    If you need to take medicine often for your migraine, talk with your healthcare provider about other ways to prevent your headaches.  Follow-up care  Follow up with your healthcare provider, or as advised. Talk with your provider if you have frequent headaches. He or she can figure out a treatment plan. Ask if you can have medicine to take at home the next time you get a bad headache. This may keep you from having to visit the emergency department in the future. You may need to see a headache specialist (neurologist) if you continue to have headaches.  When to seek medical advice  Call your healthcare provider right away if any of these occur:    Your head pain gets worse, or doesn t get better within 24 hours    You can t keep liquids down (repeated vomiting)    Pain in your  sinuses, ears, or throat    Fever of 100.4  F (38  C) or higher, or as directed by your healthcare provider    Stiff neck    Extreme drowsiness, confusion, or fainting    Dizziness, or dizziness with spinning sensation (vertigo)    Weakness in an arm or leg, or on one side of your face    Difficulty talking or seeing  Date Last Reviewed: 8/1/2016 2000-2019 [employer]. [address] [place] [address]. All rights reserved. This information is not intended as a substitute for professional medical care. Always follow your healthcare professional's instructions.           Patient Education     Anxiety Reaction  Anxiety is the feeling we all get when we think something bad might happen. It is a normal response to stress and usually causes only a mild reaction. When anxiety becomes more severe, it can interfere with daily life. In some cases, you may not even be aware of what it is you re anxious about. There may also be a genetic link or it may be a learned behavior in the home.  Both psychological and physical triggers cause stress reaction. It's often a response to fear or emotional stress, real or imagined. This stress may come from home, family, work, or social relationships.  During an anxiety reaction, you may feel:    Helpless    Nervous    Depressed    Irritable  Your body may show signs of anxiety in many ways. You may experience:    Dry mouth    Shakiness    Dizziness    Weakness    Trouble breathing    Breathing fast (hyperventilating)    Chest pressure    Sweating    Headache    Nausea    Diarrhea    Tiredness    Inability to sleep    Sexual problems  Home care    Try to locate the sources of stress in your life. They may not be obvious. These may include:  ? Daily hassles of life (such as traffic jams, missed appointments, or car troubles)  ? Major life changes, both good (new baby or job promotion) and bad (loss of job or loss of loved one)  ? Overload: feeling that you have too  many responsibilities and can't take care of all of them at once  ? Feeling helpless or feeling that your problems are beyond what you re able to solve    Notice how your body reacts to stress. Learn to listen to your body signals. This will help you take action before the stress becomes severe.    When you can, do something about the source of your stress. (Avoid hassles, limit the amount of change that happens in your life at one time and take a break when you feel overloaded).    Unfortunately, many stressful situations can't be avoided. It is necessary to learn how to better manage stress. There are many proven methods that will reduce your anxiety. These include simple things like exercise, good nutrition, and adequate rest. Also, there are certain techniques that are helpful:  ? Relaxation  ? Breathing exercises  ? Visualization  ? Biofeedback  ? Meditation  For more information about this, consult your healthcare provider or go to a local bookstore and review the many books and tapes available on this subject.  Follow-up care  If you feel that your anxiety is not responding to self-help measures, contact your healthcare provider or make an appointment with a counselor. You may need short-term psychological counseling and temporary medicine to help you manage stress.  Call 911  Call 911 if any of these happen:    Trouble breathing    Confusion    Drowsiness or trouble wakening    Fainting or loss of consciousness    Rapid heart rate    Seizure    New chest pain that becomes more severe, lasts longer, or spreads into your shoulder, arm, neck, jaw, or back  When to seek medical advice  Call your healthcare provider right away if any of these happen:    Your symptoms get worse    Severe headache not relieved by rest and mild pain reliever  Date Last Reviewed: 10/1/2017    5084-3889 [employer]. [address] [place] [address], [place] [address]. All rights reserved. This information is not intended as a  substitute for professional medical care. Always follow your healthcare professional's instructions.

## 2020-01-15 NOTE — PROGRESS NOTES
Subjective     Omayra Cadena is a 34 year old female who presents to clinic today for the following health issues:    HPI   Anxiety Follow-Up    How are you doing with your anxiety since your last visit? Improved     Are you having other symptoms that might be associated with anxiety? No    Have you had a significant life event? No     Are you feeling depressed? No    Do you have any concerns with your use of alcohol or other drugs? No    Social History     Tobacco Use     Smoking status: Former Smoker     Types: Other     Smokeless tobacco: Never Used     Tobacco comment: e-cig   Substance Use Topics     Alcohol use: Yes     Comment: once every 6 months     Drug use: No     ABDI-7 SCORE 8/6/2019 8/29/2019 1/14/2020   Total Score 2 (minimal anxiety) - 4 (minimal anxiety)   Total Score 2 1 4     PHQ 8/6/2019 8/29/2019 1/14/2020   PHQ-9 Total Score 7 3 6   Q9: Thoughts of better off dead/self-harm past 2 weeks Not at all Not at all Not at all     Last PHQ-9 1/14/2020   1.  Little interest or pleasure in doing things 0   2.  Feeling down, depressed, or hopeless 1   3.  Trouble falling or staying asleep, or sleeping too much 0   4.  Feeling tired or having little energy 1   5.  Poor appetite or overeating 3   6.  Feeling bad about yourself 1   7.  Trouble concentrating 0   8.  Moving slowly or restless 0   Q9: Thoughts of better off dead/self-harm past 2 weeks 0   PHQ-9 Total Score 6       Migraine     Since your last clinic visit, how have your headaches changed?  No change    How often are you getting headaches or migraines? Every other day     Are you able to do normal daily activities when you have a migraine? Yes    Are you taking rescue/relief medications? (Select all that apply) Excedrin    How helpful is your rescue/relief medication?  I get some relief    Are you taking any medications to prevent migraines? (Select all that apply)  Amitriptyline    In the past 4 weeks, how often have you gone to urgent  "care or the emergency room because of your headaches?  0            Patient Active Problem List   Diagnosis     Intractable migraine without aura and with status migrainosus     Generalized anxiety disorder     History reviewed. No pertinent surgical history.    Social History     Tobacco Use     Smoking status: Former Smoker     Types: Other     Smokeless tobacco: Never Used     Tobacco comment: e-cig   Substance Use Topics     Alcohol use: Yes     Comment: once every 6 months     History reviewed. No pertinent family history.      Current Outpatient Medications   Medication Sig Dispense Refill     DULoxetine (CYMBALTA) 60 MG capsule Take 1 capsule (60 mg) by mouth daily 90 capsule 1     SUMAtriptan (IMITREX) 25 MG tablet Take 1 tablet (25 mg) by mouth at onset of headache for migraine May repeat in 2 hours. Max 8 tablets/24 hours. 9 tablet 1     SUMAtriptan (IMITREX) 25 MG tablet Take 25 mg by mouth at onset of headache for migraine (as needed)       topiramate (TOPAMAX) 50 MG tablet Take 0.5 tablets (25 mg) by mouth daily for 7 days, THEN 1 tablet (50 mg) daily for 23 days. 27 tablet 0     No Known Allergies  Recent Labs   Lab Test 08/06/19  0948   ALT 28   CR 0.80   GFRESTIMATED >90   GFRESTBLACK >90   POTASSIUM 4.3   TSH 1.25      BP Readings from Last 3 Encounters:   01/15/20 128/70   08/06/19 100/70   05/09/19 116/60    Wt Readings from Last 3 Encounters:   01/15/20 78.9 kg (174 lb)   08/06/19 74 kg (163 lb 3.2 oz)   05/09/19 72.3 kg (159 lb 6.4 oz)                 Reviewed and updated as needed this visit by Provider         Review of Systems   ROS COMP: Constitutional, HEENT, cardiovascular, pulmonary, gi and gu systems are negative, except as otherwise noted.      Objective    /70 (BP Location: Right arm, Cuff Size: Adult Regular)   Pulse 76   Temp 98.2  F (36.8  C) (Tympanic)   Resp 18   Ht 1.607 m (5' 3.25\")   Wt 78.9 kg (174 lb)   BMI 30.58 kg/m    Body mass index is 30.58 kg/m .  Physical " Exam   GENERAL: healthy, alert and no distress  EYES: Eyes grossly normal to inspection, PERRL and conjunctivae and sclerae normal  HENT: ear canals and TM's normal, nose and mouth without ulcers or lesions  NECK: no adenopathy, no asymmetry, masses, or scars and thyroid normal to palpation  RESP: lungs clear to auscultation - no rales, rhonchi or wheezes  CV: regular rate and rhythm, normal S1 S2, no S3 or S4, no murmur, click or rub, no peripheral edema and peripheral pulses strong  ABDOMEN: soft, nontender, no hepatosplenomegaly, no masses and bowel sounds normal  MS: no gross musculoskeletal defects noted, no edema  SKIN: no suspicious lesions or rashes  NEURO: Normal strength and tone, mentation intact and speech normal  PSYCH: mentation appears normal, affect normal/bright    No results found for any visits on 01/15/20.          Assessment & Plan     (G43.001) Migraine without aura and with status migrainosus, not intractable  (primary encounter diagnosis)  Comment: Headaches controlled with current dose of Topamax and Imitrex renewed today  Plan: topiramate (TOPAMAX) 50 MG tablet, SUMAtriptan         (IMITREX) 25 MG tablet        *  (F41.1) Generalized anxiety disorder  Comment: Controlled with current doses Cymbalta.  Plan: Patient to continue using Cymbalta as prescribed       Return in about 3 months (around 4/15/2020) for Headaches and anxiety.    I spent 30 minute with the patient of which was 50% of the time was face to face counseling educations and coordinating care of the patient.    ANIBAL Hancock [facility]

## 2020-02-13 ENCOUNTER — TELEPHONE (OUTPATIENT)
Dept: FAMILY MEDICINE | Facility: CLINIC | Age: 35
End: 2020-02-13

## 2020-02-13 DIAGNOSIS — G43.001 MIGRAINE WITHOUT AURA AND WITH STATUS MIGRAINOSUS, NOT INTRACTABLE: Primary | ICD-10-CM

## 2020-02-13 RX ORDER — TOPIRAMATE 100 MG/1
100 TABLET, FILM COATED ORAL DAILY
Qty: 60 TABLET | Refills: 0 | Status: SHIPPED | OUTPATIENT
Start: 2020-02-13 | End: 2020-04-16

## 2020-02-13 NOTE — TELEPHONE ENCOUNTER
Notify Omayra that I have increased her Topamax to 100 mg daily.  If not improving after the next 2 to 3 weeks with increased dose patient should be re-seen.    Krysta Webster CNP

## 2020-02-13 NOTE — TELEPHONE ENCOUNTER
Omayra is calling to let Krysta know that she has taken her Topiramate 50 mg for 29 days and has 1 left. Her headaches are not getting better and wants to know if you can increase the dosage. She uses the [facility].    Esme [facility]

## 2020-03-11 ENCOUNTER — HEALTH MAINTENANCE LETTER (OUTPATIENT)
Age: 35
End: 2020-03-11

## 2020-03-25 DIAGNOSIS — F41.1 GENERALIZED ANXIETY DISORDER: ICD-10-CM

## 2020-03-25 RX ORDER — DULOXETIN HYDROCHLORIDE 60 MG/1
CAPSULE, DELAYED RELEASE ORAL
Qty: 90 CAPSULE | Refills: 1 | Status: SHIPPED | OUTPATIENT
Start: 2020-03-25 | End: 2020-10-06

## 2020-03-25 NOTE — TELEPHONE ENCOUNTER
"Requested Prescriptions   Pending Prescriptions Disp Refills     DULoxetine (CYMBALTA) 60 MG capsule [Pharmacy Med Name: DULOXETINE HCL 60MG CPEP] 90 capsule 1     Sig: TAKE ONE CAPSULE BY MOUTH ONCE DAILY       Serotonin-Norepinephrine Reuptake Inhibitors  Passed - 3/25/2020  2:40 PM        Passed - Blood pressure under 140/90 in past 12 months     BP Readings from Last 3 Encounters:   01/15/20 128/70   08/06/19 100/70   05/09/19 116/60                 Passed - Recent (12 mo) or future (30 days) visit within the authorizing provider's specialty     Patient has had an office visit with the authorizing provider or a provider within the authorizing providers department within the previous 12 mos or has a future within next 30 days. See \"Patient Info\" tab in inbasket, or \"Choose Columns\" in Meds & Orders section of the refill encounter.              Passed - Medication is active on med list        Passed - Patient is age 18 or older        Passed - No active pregnancy on record        Passed - No positive pregnancy test in past 12 months         Last Written Prescription Date:  8/29/19  Last Fill Quantity: 90,  # refills: 1   Last office visit: 1/15/2020 with prescribing provider:     Future Office Visit:    "

## 2020-04-15 DIAGNOSIS — G43.001 MIGRAINE WITHOUT AURA AND WITH STATUS MIGRAINOSUS, NOT INTRACTABLE: ICD-10-CM

## 2020-04-15 NOTE — TELEPHONE ENCOUNTER
"Requested Prescriptions   Pending Prescriptions Disp Refills     topiramate (TOPAMAX) 100 MG tablet [Pharmacy Med Name: TOPIRAMATE 100MG TABS] 60 tablet 0     Sig: TAKE ONE TABLET BY MOUTH ONCE DAILY       Anti-Seizure Meds Protocol  Failed - 4/15/2020  3:49 PM        Failed - Review Authorizing provider's last note.      Refer to last progress notes: confirm request is for original authorizing provider (cannot be through other providers).          Passed - Recent (12 mo) or future (30 days) visit within the authorizing provider's specialty     Patient has had an office visit with the authorizing provider or a provider within the authorizing providers department within the previous 12 mos or has a future within next 30 days. See \"Patient Info\" tab in inbasket, or \"Choose Columns\" in Meds & Orders section of the refill encounter.              Passed - Normal CBC on file in past 26 months     Recent Labs   Lab Test 08/06/19  0948   WBC 6.7   RBC 4.49   HGB 12.9   HCT 41.1                    Passed - Normal ALT or AST on file in past 26 months     Recent Labs   Lab Test 08/06/19  0948   ALT 28     Recent Labs   Lab Test 08/06/19  0948   AST 19             Passed - Normal platelet count on file in past 26 months     Recent Labs   Lab Test 08/06/19  0948                  Passed - Medication is active on med list        Passed - No active pregnancy on record        Passed - No positive pregnancy test in last 12 months           Last Written Prescription Date:  2/13/20  Last Fill Quantity: 60,  # refills: 0   Last office visit: 1/15/2020 with prescribing provider:  CARI   Future Office Visit:      "

## 2020-04-16 RX ORDER — TOPIRAMATE 100 MG/1
TABLET, FILM COATED ORAL
Qty: 30 TABLET | Refills: 0 | Status: SHIPPED | OUTPATIENT
Start: 2020-04-16 | End: 2020-05-15

## 2020-05-05 ENCOUNTER — TRANSFERRED RECORDS (OUTPATIENT)
Dept: HEALTH INFORMATION MANAGEMENT | Facility: CLINIC | Age: 35
End: 2020-05-05

## 2020-05-14 DIAGNOSIS — G43.001 MIGRAINE WITHOUT AURA AND WITH STATUS MIGRAINOSUS, NOT INTRACTABLE: ICD-10-CM

## 2020-05-15 ENCOUNTER — VIRTUAL VISIT (OUTPATIENT)
Dept: FAMILY MEDICINE | Facility: CLINIC | Age: 35
End: 2020-05-15
Payer: COMMERCIAL

## 2020-05-15 DIAGNOSIS — G43.001 MIGRAINE WITHOUT AURA AND WITH STATUS MIGRAINOSUS, NOT INTRACTABLE: Primary | ICD-10-CM

## 2020-05-15 DIAGNOSIS — R19.8 IRREGULAR BOWEL HABITS: ICD-10-CM

## 2020-05-15 DIAGNOSIS — F41.1 GENERALIZED ANXIETY DISORDER: ICD-10-CM

## 2020-05-15 PROCEDURE — 99214 OFFICE O/P EST MOD 30 MIN: CPT | Mod: 95 | Performed by: FAMILY MEDICINE

## 2020-05-15 RX ORDER — FEXOFENADINE HCL 180 MG/1
180 TABLET ORAL DAILY
COMMUNITY

## 2020-05-15 RX ORDER — TOPIRAMATE 100 MG/1
100 TABLET, FILM COATED ORAL DAILY
Qty: 30 TABLET | Refills: 5 | Status: SHIPPED | OUTPATIENT
Start: 2020-05-15 | End: 2020-11-10 | Stop reason: DRUGHIGH

## 2020-05-15 RX ORDER — TOPIRAMATE 100 MG/1
TABLET, FILM COATED ORAL
Qty: 30 TABLET | Refills: 0 | OUTPATIENT
Start: 2020-05-15

## 2020-05-15 NOTE — PROGRESS NOTES
"Omayra Cadena is a 35 year old female who is being evaluated via a billable video visit.      The patient has been notified of following:     \"This video visit will be conducted via a call between you and your physician/provider. We have found that certain health care needs can be provided without the need for an in-person physical exam.  This service lets us provide the care you need with a video conversation.  If a prescription is necessary we can send it directly to your pharmacy.  If lab work is needed we can place an order for that and you can then stop by our lab to have the test done at a later time.    Video visits are billed at different rates depending on your insurance coverage.  Please reach out to your insurance provider with any questions.    If during the course of the call the physician/provider feels a video visit is not appropriate, you will not be charged for this service.\"    Patient has given verbal consent for Video visit? Yes    How would you like to obtain your AVS? [facility]    Patient would like the video invitation sent by: Text to cell phone: [phone]    Will anyone else be joining your video visit? No       Video Start Time: 4:36    SUBJECTIVE: Omayra Cadena is a 35 year old female  Chief Complaint   Patient presents with     Headache     refill migraine medication      Today I am doing a video visit due to COVID-19 virus outbreak and attempts to limit clinic visits.      Last clinic visit: 1/15/2020 related to anxiety.       Migraine           How often are you getting headaches or migraines? 4 x a week     Are you able to do normal daily activities when you have a migraine? Yes    Are you taking rescue/relief medications? (Select all that apply) Other: Topamax    How helpful is your rescue/relief medication?  I get some relief    Are you taking any medications to prevent migraines? (Select all that apply)  Topomax    In the past 4 weeks, how often have you gone to urgent " "care or the emergency room because of your headaches?  0  Omayra's visit today was mostly to get refills on topiramate.  She has been on this for migraine headache prevention.  She stated she took it years ago and then was off for a period of time and now back on it for the last 6 to 8 months.  She is taking 100 mg once daily.  She recalls having had some side effects in the past but not currently.  She reports it worked very well in reducing her headache frequency.  She reports current to leave that she is having headaches 3-4 times a week.  The headaches typically occur in the afternoon.  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  The headaches are consistently behind her left eye.  She has no aura.  If the headaches go on for a while she can have some visual symptoms but no aura.    She recalls taking some other preventative medications which did not help she did not recall about amitriptyline and if it had side effects.  When she gets a headache she takes a couple of Excedrin and that usually works very well for getting rid of the headache.  She does take sumatriptan at times perhaps once or twice a month which also helps for the headaches.    Second concern today was that her bowels have been irregular.  She has had constipation for years and this is her typical pattern.  However, recently she has noted that bowel movements sometimes are loose and sometimes has some urgency.  She has had no recent change in medications or health otherwise to cause the change.    Her chiropractor had recommended a neurology referral and she was interested in pursuing that for the migraine headaches.     Objective    There were no vitals taken for this visit.  Estimated body mass index is 30.58 kg/m  as calculated from the following:    Height as of 1/15/20: 1.607 m (5' 3.25\").    Weight as of 1/15/20: 78.9 kg (174 lb).  Physical Exam     GENERAL: Healthy, alert and no distress  EYES: Eyes grossly normal to " inspection.  No discharge or erythema, or obvious scleral/conjunctival abnormalities.  RESP: No audible wheeze, cough, or visible cyanosis.  No visible retractions or increased work of breathing.    SKIN: Visible skin clear. No significant rash, abnormal pigmentation or lesions.  NEURO: Cranial nerves grossly intact.  Mentation and speech appropriate for age.  PSYCH: Mentation appears normal, affect normal/bright, judgement and insight intact, normal speech and appearance well-groomed.      ASSESSMENT:   (G43.001) Migraine without aura and with status migrainosus, not intractable  (primary encounter diagnosis)  Comment: Doing OK at this time but still frequent migraines.    Plan: topiramate (TOPAMAX) 100 MG tablet, NEUROLOGY         ADULT REFERRAL          Refills on topiramate 100mg daily.   Schedule an appointment with neurology.   Continue the excedrin for headaches as needed.  OK for sumatriptan as needed.     (F41.1) Generalized anxiety disorder  Comment: doing OK at this time.   Plan: No change in current treatment plan.     (R19.8) Irregular bowel habits  Comment: Mostly constipation, some loose stools and urgency.   Plan:   I suggest trial Metamucil.  This absorbs water and can help for both diarrhea and constipation.  Metamucil is a good source of fiber.  The sugar free Metamucil is a good choice as it has a higher amount of fiber.  Start with a tablespoon once daily.  This is mixed with a glass of water.  The dose could be increased to twice daily if needed.           Video-Visit Details    Type of service:  Video Visit    Video End Time:4:54    Originating Location (pt. Location): Home    Distant Location (provider location):  [facility]     Platform used for Video Visit: Anuel    No follow-ups on file.       MARIELOS ABRAMS MD

## 2020-05-15 NOTE — TELEPHONE ENCOUNTER
Routing refill request to provider for review/approval because:  Carissa refill already given x1   Patient needs to be seen because:  Last OV: Return in about 3 months (around 4/15/2020) for Headaches and anxiety.    Jenny HUFF RN, BSN

## 2020-05-15 NOTE — PATIENT INSTRUCTIONS
ASSESSMENT:   (G43.001) Migraine without aura and with status migrainosus, not intractable  (primary encounter diagnosis)  Comment: Doing OK at this time but still frequent migraines.    Plan: topiramate (TOPAMAX) 100 MG tablet, NEUROLOGY         ADULT REFERRAL          Refills on topiramate 100mg daily.   Schedule an appointment with neurology.   Continue the excedrin for headaches as needed.  OK for sumatriptan as needed.     (F41.1) Generalized anxiety disorder  Comment: doing OK at this time.   Plan: No change in current treatment plan.     (R19.8) Irregular bowel habits  Comment: Mostly constipation, some loose stools and urgency.   Plan:   I suggest trial Metamucil.  This absorbs water and can help for both diarrhea and constipation.  Metamucil is a good source of fiber.  The sugar free Metamucil is a good choice as it has a higher amount of fiber.  Start with a tablespoon once daily.  This is mixed with a glass of water.  The dose could be increased to twice daily if needed.

## 2020-05-20 ENCOUNTER — TRANSFERRED RECORDS (OUTPATIENT)
Dept: HEALTH INFORMATION MANAGEMENT | Facility: CLINIC | Age: 35
End: 2020-05-20

## 2020-07-08 ENCOUNTER — TRANSFERRED RECORDS (OUTPATIENT)
Dept: HEALTH INFORMATION MANAGEMENT | Facility: CLINIC | Age: 35
End: 2020-07-08

## 2020-10-05 DIAGNOSIS — F41.1 GENERALIZED ANXIETY DISORDER: ICD-10-CM

## 2020-10-06 ENCOUNTER — TELEPHONE (OUTPATIENT)
Dept: FAMILY MEDICINE | Facility: CLINIC | Age: 35
End: 2020-10-06

## 2020-10-06 DIAGNOSIS — F41.1 GENERALIZED ANXIETY DISORDER: ICD-10-CM

## 2020-10-06 DIAGNOSIS — G43.019 INTRACTABLE MIGRAINE WITHOUT AURA AND WITHOUT STATUS MIGRAINOSUS: Primary | ICD-10-CM

## 2020-10-06 RX ORDER — DULOXETIN HYDROCHLORIDE 60 MG/1
CAPSULE, DELAYED RELEASE ORAL
Qty: 90 CAPSULE | Refills: 1 | Status: SHIPPED | OUTPATIENT
Start: 2020-10-06 | End: 2020-10-07

## 2020-10-06 NOTE — TELEPHONE ENCOUNTER
Patient  believes she found the root of her migraines and would like a tapering dose of topamax to try to get off of it.        Other request: Tapering dose of Topamax    Phone number to reach patient:  Cell number on file:    Telephone Information:   Mobile [phone]       Best Time:  Anytime    Can we leave a detailed message on this number?  YES    Travel screening: Not Applicable

## 2020-10-07 RX ORDER — TOPIRAMATE 25 MG/1
TABLET, FILM COATED ORAL
Qty: 42 TABLET | Refills: 0 | Status: SHIPPED | OUTPATIENT
Start: 2020-10-07 | End: 2021-09-09

## 2020-10-07 RX ORDER — DULOXETIN HYDROCHLORIDE 60 MG/1
CAPSULE, DELAYED RELEASE ORAL
Qty: 90 CAPSULE | Refills: 1 | Status: SHIPPED | OUTPATIENT
Start: 2020-10-07 | End: 2020-11-12

## 2020-10-07 NOTE — TELEPHONE ENCOUNTER
Contact patient I have refilled her Cymbalta.      I have also put in a tapering dose of Topamax to take 75 mg for 1 week then decrease to 50 mg for a week then 25 mg for a week and then discontinue if any further issues or concerns please have patient make a virtual visit.    Krysta Webster CNP

## 2020-11-09 ENCOUNTER — MYC MEDICAL ADVICE (OUTPATIENT)
Dept: FAMILY MEDICINE | Facility: CLINIC | Age: 35
End: 2020-11-09

## 2020-11-10 ENCOUNTER — VIRTUAL VISIT (OUTPATIENT)
Dept: FAMILY MEDICINE | Facility: CLINIC | Age: 35
End: 2020-11-10
Payer: COMMERCIAL

## 2020-11-10 DIAGNOSIS — F41.1 GENERALIZED ANXIETY DISORDER: Primary | ICD-10-CM

## 2020-11-10 PROCEDURE — 99214 OFFICE O/P EST MOD 30 MIN: CPT | Mod: GT | Performed by: NURSE PRACTITIONER

## 2020-11-10 RX ORDER — DULOXETIN HYDROCHLORIDE 20 MG/1
20 CAPSULE, DELAYED RELEASE ORAL 2 TIMES DAILY
Qty: 90 CAPSULE | Refills: 1 | Status: SHIPPED | OUTPATIENT
Start: 2020-11-10 | End: 2020-11-12

## 2020-11-10 RX ORDER — ALPRAZOLAM 0.25 MG
0.25 TABLET ORAL 2 TIMES DAILY PRN
Qty: 12 TABLET | Refills: 0 | Status: SHIPPED | OUTPATIENT
Start: 2020-11-10 | End: 2023-05-25

## 2020-11-10 RX ORDER — DULOXETIN HYDROCHLORIDE 60 MG/1
60 CAPSULE, DELAYED RELEASE ORAL DAILY
Qty: 90 CAPSULE | Refills: 1 | Status: SHIPPED | OUTPATIENT
Start: 2020-11-10 | End: 2023-05-26

## 2020-11-11 ENCOUNTER — TELEPHONE (OUTPATIENT)
Dept: FAMILY MEDICINE | Facility: CLINIC | Age: 35
End: 2020-11-11

## 2020-11-11 DIAGNOSIS — F41.1 GENERALIZED ANXIETY DISORDER: ICD-10-CM

## 2020-11-11 RX ORDER — DULOXETIN HYDROCHLORIDE 20 MG/1
20 CAPSULE, DELAYED RELEASE ORAL 2 TIMES DAILY
Qty: 90 CAPSULE | Refills: 1 | Status: CANCELLED | OUTPATIENT
Start: 2020-11-11

## 2020-11-12 RX ORDER — DULOXETIN HYDROCHLORIDE 20 MG/1
20 CAPSULE, DELAYED RELEASE ORAL DAILY
Qty: 90 CAPSULE | Refills: 1 | Status: SHIPPED | OUTPATIENT
Start: 2020-11-12 | End: 2021-09-09

## 2020-11-13 NOTE — PATIENT INSTRUCTIONS
Patient Education     Anxiety Reaction  Anxiety is the feeling we all get when we think something bad might happen. It is a normal response to stress and usually causes only a mild reaction. When anxiety becomes more severe, it can interfere with daily life. In some cases, you may not even be aware of what it is you re anxious about. There may also be a genetic link or it may be a learned behavior in the home.  Both psychological and physical triggers cause stress reaction. It's often a response to fear or emotional stress, real or imagined. This stress may come from home, family, work, or social relationships.  During an anxiety reaction, you may feel:    Helpless    Nervous    Depressed    Irritable  Your body may show signs of anxiety in many ways. You may experience:    Dry mouth    Shakiness    Dizziness    Weakness    Trouble breathing    Breathing fast (hyperventilating)    Chest pressure    Sweating    Headache    Nausea    Diarrhea    Tiredness    Inability to sleep    Sexual problems  Home care    Try to locate the sources of stress in your life. They may not be obvious. These may include:  ? Daily hassles of life (such as traffic jams, missed appointments, or car troubles)  ? Major life changes, both good (new baby or job promotion) and bad (loss of job or loss of loved one)  ? Overload: feeling that you have too many responsibilities and can't take care of all of them at once  ? Feeling helpless or feeling that your problems are beyond what you re able to solve    Notice how your body reacts to stress. Learn to listen to your body signals. This will help you take action before the stress becomes severe.    When you can, do something about the source of your stress. (Avoid hassles, limit the amount of change that happens in your life at one time and take a break when you feel overloaded).    Unfortunately, many stressful situations can't be avoided. It is necessary to learn how to better manage stress.  There are many proven methods that will reduce your anxiety. These include simple things like exercise, good nutrition, and adequate rest. Also, there are certain techniques that are helpful:  ? Relaxation  ? Breathing exercises  ? Visualization  ? Biofeedback  ? Meditation  For more information about this, consult your healthcare provider or go to a local bookstore and review the many books and tapes available on this subject.  Follow-up care  If you feel that your anxiety is not responding to self-help measures, contact your healthcare provider or make an appointment with a counselor. You may need short-term psychological counseling and temporary medicine to help you manage stress.  Call 911  Call 911 if any of these happen:    Trouble breathing    Confusion    Drowsiness or trouble wakening    Fainting or loss of consciousness    Rapid heart rate    Seizure    New chest pain that becomes more severe, lasts longer, or spreads into your shoulder, arm, neck, jaw, or back  When to seek medical advice  Call your healthcare provider right away if any of these happen:    Your symptoms get worse    Severe headache not relieved by rest and mild pain reliever  [employer] last reviewed this educational content on 10/1/2017    0578-1917 [email or web address]. [address] [place] [address]. All rights reserved. This information is not intended as a substitute for professional medical care. Always follow your healthcare professional's instructions.           Patient Education     Understanding Generalized Anxiety Disorder (ABDI)    Anxiety can fill you with worry and fear. Sometimes anxiety is healthy. It alerts you to a potential threat and gets you to respond and take action. But for some people, anxiety gets so bad it causes problems in daily life. If you find yourself in a constant state of anxiety, you may have an anxiety disorder called generalized anxiety disorder (ABDI). Speak with your healthcare provider  or mental health professional to learn more. He or she can help.   What is generalized anxiety disorder?  ABDI means that you are worrying constantly and can t control the worrying. Healthcare providers diagnose ABDI when your worrying happens on most days and for at least 6 months.  With ABDI, you might worry about money, your family and friends, work, or the world in general. You might not even be sure what you're anxious about. But whatever it is, you have an intense fear that the worst will happen. These feelings never really go away. In people age 65 and older, ABDI is one of the most commonly diagnosed anxiety disorders.  Many times it occurs with depression. This constant worry affects your quality of life and makes it hard to function. ABDI can cause physical symptoms, too.  What are common symptoms of generalized anxiety disorder?  People with ABDI often think they have a physical illness. The disorder can cause symptoms, such as:    Excessive worry that interferes with daily activities and lasts for at least 6 months    Muscle tension, especially in the neck and shoulders    Nausea and stomach problems    Frequent headaches    Feeling lightheaded    Restlessness, trouble sleeping    Feeling irritable and on edge all the time  How can generalized anxiety disorder be treated?  ABDI can be treated with medicine or therapy (also called counseling), or both. Medicine helps to reduce symptoms, so you can continue with your daily routine. Therapy helps you understand the cause of your anxiety and learn how to manage it. Both forms of treatment help you deal with problems that anxiety causes in your life. This helps you to be healthier and happier.  [employer] last reviewed this educational content on 5/1/2017 2000-2020 [employer]. [address] [place] [address], [place] [address]. All rights reserved. This information is not intended as a substitute for professional medical care. Always follow your healthcare  professional's instructions.

## 2021-01-03 ENCOUNTER — HEALTH MAINTENANCE LETTER (OUTPATIENT)
Age: 36
End: 2021-01-03

## 2021-04-25 ENCOUNTER — HEALTH MAINTENANCE LETTER (OUTPATIENT)
Age: 36
End: 2021-04-25

## 2021-04-29 ENCOUNTER — TRANSFERRED RECORDS (OUTPATIENT)
Dept: HEALTH INFORMATION MANAGEMENT | Facility: CLINIC | Age: 36
End: 2021-04-29

## 2021-05-29 ENCOUNTER — RECORDS - HEALTHEAST (OUTPATIENT)
Dept: ADMINISTRATIVE | Facility: CLINIC | Age: 36
End: 2021-05-29

## 2021-06-03 ENCOUNTER — RECORDS - HEALTHEAST (OUTPATIENT)
Dept: ADMINISTRATIVE | Facility: CLINIC | Age: 36
End: 2021-06-03

## 2021-09-08 ASSESSMENT — ANXIETY QUESTIONNAIRES
1. FEELING NERVOUS, ANXIOUS, OR ON EDGE: SEVERAL DAYS
2. NOT BEING ABLE TO STOP OR CONTROL WORRYING: NOT AT ALL
GAD7 TOTAL SCORE: 2
7. FEELING AFRAID AS IF SOMETHING AWFUL MIGHT HAPPEN: NOT AT ALL
GAD7 TOTAL SCORE: 2
GAD7 TOTAL SCORE: 2
4. TROUBLE RELAXING: NOT AT ALL
5. BEING SO RESTLESS THAT IT IS HARD TO SIT STILL: NOT AT ALL
8. IF YOU CHECKED OFF ANY PROBLEMS, HOW DIFFICULT HAVE THESE MADE IT FOR YOU TO DO YOUR WORK, TAKE CARE OF THINGS AT HOME, OR GET ALONG WITH OTHER PEOPLE?: NOT DIFFICULT AT ALL
6. BECOMING EASILY ANNOYED OR IRRITABLE: SEVERAL DAYS
7. FEELING AFRAID AS IF SOMETHING AWFUL MIGHT HAPPEN: NOT AT ALL
3. WORRYING TOO MUCH ABOUT DIFFERENT THINGS: NOT AT ALL

## 2021-09-08 ASSESSMENT — PATIENT HEALTH QUESTIONNAIRE - PHQ9
SUM OF ALL RESPONSES TO PHQ QUESTIONS 1-9: 5
10. IF YOU CHECKED OFF ANY PROBLEMS, HOW DIFFICULT HAVE THESE PROBLEMS MADE IT FOR YOU TO DO YOUR WORK, TAKE CARE OF THINGS AT HOME, OR GET ALONG WITH OTHER PEOPLE: NOT DIFFICULT AT ALL
SUM OF ALL RESPONSES TO PHQ QUESTIONS 1-9: 5

## 2021-09-09 ENCOUNTER — OFFICE VISIT (OUTPATIENT)
Dept: FAMILY MEDICINE | Facility: CLINIC | Age: 36
End: 2021-09-09
Payer: COMMERCIAL

## 2021-09-09 VITALS
HEIGHT: 63 IN | TEMPERATURE: 97 F | BODY MASS INDEX: 31.36 KG/M2 | SYSTOLIC BLOOD PRESSURE: 110 MMHG | DIASTOLIC BLOOD PRESSURE: 70 MMHG | HEART RATE: 80 BPM | WEIGHT: 177 LBS

## 2021-09-09 DIAGNOSIS — E66.09 CLASS 1 OBESITY DUE TO EXCESS CALORIES WITHOUT SERIOUS COMORBIDITY WITH BODY MASS INDEX (BMI) OF 31.0 TO 31.9 IN ADULT: ICD-10-CM

## 2021-09-09 DIAGNOSIS — F41.1 GENERALIZED ANXIETY DISORDER: Primary | ICD-10-CM

## 2021-09-09 DIAGNOSIS — Z23 HIGH PRIORITY FOR 2019-NCOV VACCINE: ICD-10-CM

## 2021-09-09 DIAGNOSIS — E66.811 CLASS 1 OBESITY DUE TO EXCESS CALORIES WITHOUT SERIOUS COMORBIDITY WITH BODY MASS INDEX (BMI) OF 31.0 TO 31.9 IN ADULT: ICD-10-CM

## 2021-09-09 LAB
ALBUMIN SERPL-MCNC: 3.8 G/DL (ref 3.4–5)
ALP SERPL-CCNC: 52 U/L (ref 40–150)
ALT SERPL W P-5'-P-CCNC: 26 U/L (ref 0–50)
ANION GAP SERPL CALCULATED.3IONS-SCNC: 3 MMOL/L (ref 3–14)
AST SERPL W P-5'-P-CCNC: 17 U/L (ref 0–45)
BILIRUB SERPL-MCNC: 0.2 MG/DL (ref 0.2–1.3)
BUN SERPL-MCNC: 12 MG/DL (ref 7–30)
CALCIUM SERPL-MCNC: 8.2 MG/DL (ref 8.5–10.1)
CHLORIDE BLD-SCNC: 107 MMOL/L (ref 94–109)
CO2 SERPL-SCNC: 28 MMOL/L (ref 20–32)
CREAT SERPL-MCNC: 0.88 MG/DL (ref 0.52–1.04)
GFR SERPL CREATININE-BSD FRML MDRD: 85 ML/MIN/1.73M2
GLUCOSE BLD-MCNC: 83 MG/DL (ref 70–99)
POTASSIUM BLD-SCNC: 4 MMOL/L (ref 3.4–5.3)
PROT SERPL-MCNC: 6.7 G/DL (ref 6.8–8.8)
SODIUM SERPL-SCNC: 138 MMOL/L (ref 133–144)
TSH SERPL DL<=0.005 MIU/L-ACNC: 0.7 MU/L (ref 0.4–4)

## 2021-09-09 PROCEDURE — 80053 COMPREHEN METABOLIC PANEL: CPT | Performed by: NURSE PRACTITIONER

## 2021-09-09 PROCEDURE — 36415 COLL VENOUS BLD VENIPUNCTURE: CPT | Performed by: NURSE PRACTITIONER

## 2021-09-09 PROCEDURE — 91301 COVID-19,PF,MODERNA: CPT | Performed by: NURSE PRACTITIONER

## 2021-09-09 PROCEDURE — 99214 OFFICE O/P EST MOD 30 MIN: CPT | Mod: 25 | Performed by: NURSE PRACTITIONER

## 2021-09-09 PROCEDURE — 84443 ASSAY THYROID STIM HORMONE: CPT | Performed by: NURSE PRACTITIONER

## 2021-09-09 PROCEDURE — 0011A COVID-19,PF,MODERNA: CPT | Performed by: NURSE PRACTITIONER

## 2021-09-09 RX ORDER — DULOXETIN HYDROCHLORIDE 60 MG/1
60 CAPSULE, DELAYED RELEASE ORAL DAILY
Qty: 90 CAPSULE | Refills: 3 | Status: SHIPPED | OUTPATIENT
Start: 2021-09-09 | End: 2023-05-25

## 2021-09-09 ASSESSMENT — MIFFLIN-ST. JEOR: SCORE: 1470.96

## 2021-09-09 ASSESSMENT — PATIENT HEALTH QUESTIONNAIRE - PHQ9: SUM OF ALL RESPONSES TO PHQ QUESTIONS 1-9: 5

## 2021-09-09 ASSESSMENT — ANXIETY QUESTIONNAIRES: GAD7 TOTAL SCORE: 2

## 2021-09-09 NOTE — PROGRESS NOTES
"    Assessment & Plan     (F41.1) Generalized anxiety disorder  (primary encounter diagnosis)  Comment:  Controlled no change in treatment plan  Plan: DULoxetine (CYMBALTA) 60 MG capsule      (E66.09,  Z68.31) Class 1 obesity due to excess calories without serious comorbidity with body mass index (BMI) of 31.0 to 31.9 in adult  Comment: Patient has a 20 pound plus weight gain over the last 2 years will check thyroid and labs also recommend follow-up with bariatric clinic referral has been placed  Plan: TSH with free T4 reflex, Comprehensive         metabolic panel (BMP + Alb, Alk Phos, ALT, AST,        Total. Bili, TP), Comprehensive Weight         Management           BMI:   Estimated body mass index is 31.11 kg/m  as calculated from the following:    Height as of this encounter: 1.607 m (5' 3.25\").    Weight as of this encounter: 80.3 kg (177 lb).   Weight management plan: Patient referred to endocrine and/or weight management specialty    See Patient Instructions    No follow-ups on file.    ANIBAL [facility]    Tiara Cutler is a 36 year old who presents for the following health issues     HPI     Patient would like to discuss options to help her lose weight.     Anxiety Follow-Up    How are you doing with your anxiety since your last visit? No change    Are you having other symptoms that might be associated with anxiety? No    Have you had a significant life event? No     Are you feeling depressed? No    Do you have any concerns with your use of alcohol or other drugs? No    Social History     Tobacco Use     Smoking status: Former Smoker     Types: Other     Smokeless tobacco: Never Used     Tobacco comment: e-cig   Substance Use Topics     Alcohol use: Yes     Comment: once every 6 months     Drug use: No     ABDI-7 SCORE 1/14/2020 11/10/2020 9/8/2021   Total Score 4 (minimal anxiety) 5 (mild anxiety) 2 (minimal anxiety)   Total Score 4 5 2     PHQ " "1/14/2020 11/10/2020 9/8/2021   PHQ-9 Total Score 6 13 5   Q9: Thoughts of better off dead/self-harm past 2 weeks Not at all Not at all Not at all     ABDI-7  9/8/2021   1. Feeling nervous, anxious, or on edge 1   2. Not being able to stop or control worrying 0   3. Worrying too much about different things 0   4. Trouble relaxing 0   5. Being so restless that it is hard to sit still 0   6. Becoming easily annoyed or irritable 1   7. Feeling afraid, as if something awful might happen 0   ABDI-7 Total Score 2             Review of Systems   Constitutional, HEENT, cardiovascular, pulmonary, gi and gu systems are negative, except as otherwise noted.      Objective    /70 (BP Location: Right arm, Cuff Size: Adult Large)   Pulse 80   Temp 97  F (36.1  C) (Tympanic)   Ht 1.607 m (5' 3.25\")   Wt 80.3 kg (177 lb)   BMI 31.11 kg/m    Body mass index is 31.11 kg/m .  Physical Exam   GENERAL: healthy, alert and no distress  EYES: Eyes grossly normal to inspection, PERRL and conjunctivae and sclerae normal  HENT: ear canals and TM's normal, nose and mouth without ulcers or lesions  NECK: no adenopathy, no asymmetry, masses, or scars and thyroid normal to palpation  RESP: lungs clear to auscultation - no rales, rhonchi or wheezes  CV: regular rate and rhythm, normal S1 S2, no S3 or S4, no murmur, click or rub, no peripheral edema and peripheral pulses strong  ABDOMEN: soft, nontender, no hepatosplenomegaly, no masses and bowel sounds normal  MS: no gross musculoskeletal defects noted, no edema  SKIN: no suspicious lesions or rashes  NEURO: Normal strength and tone, mentation intact and speech normal  PSYCH: mentation appears normal, affect normal/bright    No results found for any visits on 09/09/21.          "

## 2021-10-10 ENCOUNTER — HEALTH MAINTENANCE LETTER (OUTPATIENT)
Age: 36
End: 2021-10-10

## 2021-10-11 ENCOUNTER — ALLIED HEALTH/NURSE VISIT (OUTPATIENT)
Dept: FAMILY MEDICINE | Facility: CLINIC | Age: 36
End: 2021-10-11
Payer: COMMERCIAL

## 2021-10-11 DIAGNOSIS — Z23 HIGH PRIORITY FOR 2019-NCOV VACCINE: Primary | ICD-10-CM

## 2021-10-11 PROCEDURE — 91301 COVID-19,PF,MODERNA (18+ YRS): CPT

## 2021-10-11 PROCEDURE — 0012A COVID-19,PF,MODERNA (18+ YRS): CPT

## 2021-10-11 PROCEDURE — 99207 PR NO CHARGE NURSE ONLY: CPT

## 2021-10-12 ENCOUNTER — MYC MEDICAL ADVICE (OUTPATIENT)
Dept: FAMILY MEDICINE | Facility: CLINIC | Age: 36
End: 2021-10-12

## 2021-10-12 DIAGNOSIS — G43.001 MIGRAINE WITHOUT AURA AND WITH STATUS MIGRAINOSUS, NOT INTRACTABLE: Primary | ICD-10-CM

## 2021-10-12 RX ORDER — RIZATRIPTAN BENZOATE 10 MG/1
10 TABLET ORAL
Qty: 9 TABLET | Refills: 0 | Status: SHIPPED | OUTPATIENT
Start: 2021-10-12 | End: 2024-02-02

## 2022-06-26 ENCOUNTER — HEALTH MAINTENANCE LETTER (OUTPATIENT)
Age: 37
End: 2022-06-26

## 2022-10-07 ENCOUNTER — TELEPHONE (OUTPATIENT)
Dept: FAMILY MEDICINE | Facility: CLINIC | Age: 37
End: 2022-10-07

## 2022-10-07 ENCOUNTER — OFFICE VISIT (OUTPATIENT)
Dept: URGENT CARE | Facility: URGENT CARE | Age: 37
End: 2022-10-07
Payer: COMMERCIAL

## 2022-10-07 VITALS
TEMPERATURE: 97.8 F | DIASTOLIC BLOOD PRESSURE: 70 MMHG | SYSTOLIC BLOOD PRESSURE: 103 MMHG | HEART RATE: 70 BPM | OXYGEN SATURATION: 98 %

## 2022-10-07 DIAGNOSIS — U07.1 INFECTION DUE TO 2019 NOVEL CORONAVIRUS: ICD-10-CM

## 2022-10-07 DIAGNOSIS — H69.93 DYSFUNCTION OF BOTH EUSTACHIAN TUBES: Primary | ICD-10-CM

## 2022-10-07 PROCEDURE — 99213 OFFICE O/P EST LOW 20 MIN: CPT | Mod: CS | Performed by: FAMILY MEDICINE

## 2022-10-07 NOTE — TELEPHONE ENCOUNTER
"S-(situation): Bilat ear popping, ringing, pain. Pos COVID.  Asking if needs appt or \"tough it out\".     B-(background):Onset sx one wk ago today, started with bilat ear pain. Next day (Sat) felt fine. Increased sx again Sun/ Mon- pos home COVID test. Bilat ear popping, ringing over coarse of week with lt ear pain at HS. Also reports dizziness, imbalance sx.   Has applied heat to ear; has taken OTC Advil cold and sinus D, Mucinex. Takes anthistamine refularly for seasonal allergies. No nasal sprays at this time.     A-(assessment): Denies fevers, chills. Slight cough, sinus fullness. Blowing nose frequently with clear nasal secretions. No ear drng.    R-(recommendations): Advised may try Flonase nasal spray along with current OTC sx management. Push fluids, tyl/ ibu for ear pain. Advise to be eval in UC within next 24 hrs if sx persist/ worsen.   Pt voices understanding/ agreement.   ARNOL Argueta RN    "

## 2022-10-07 NOTE — PROGRESS NOTES
SUBJECTIVE:  Omayra Cadena is a 37 year old female who presents with bilateral ear fullness and hearing loss for 1 week(s).   Severity: moderate   Timing:gradual onset  Additional symptoms include tinnitus   And full ears .      History of recurrent otitis: no    Past Medical History:   Diagnosis Date     Allergic rhinitis due to other allergen      Current Outpatient Medications   Medication Sig Dispense Refill     ALLERGY SHOTS   0     ALPRAZolam (XANAX) 0.25 MG tablet Take 1 tablet (0.25 mg) by mouth 2 times daily as needed for anxiety 12 tablet 0     DEPO-PROVERA 150 MG/ML IM SUSP 1 ML EVERY 3 MONTHS .9 0     DULoxetine (CYMBALTA) 60 MG capsule Take 1 capsule (60 mg) by mouth daily 90 capsule 3     DULoxetine (CYMBALTA) 60 MG capsule Take 1 capsule (60 mg) by mouth daily Take with the 20 mg for a total of 80 mg 90 capsule 1     fexofenadine (ALLEGRA) 180 MG tablet Take 180 mg by mouth daily       rizatriptan (MAXALT) 10 MG tablet Take 1 tablet (10 mg) by mouth at onset of headache for migraine May repeat in 2 hours. Max 3 tablets/24 hours. 9 tablet 0     SUMAtriptan (IMITREX) 25 MG tablet Take 1 tablet (25 mg) by mouth at onset of headache for migraine May repeat in 2 hours. Max 8 tablets/24 hours. 9 tablet 1     Social History     Tobacco Use     Smoking status: Former Smoker     Types: Other     Smokeless tobacco: Never Used     Tobacco comment: e-cig   Substance Use Topics     Alcohol use: Yes     Comment: once every 6 months     Lost taste on ROS:   Review of systems negative except as stated above.    OBJECTIVE:  /70   Pulse 70   Temp 97.8  F (36.6  C) (Tympanic)   SpO2 98%    EXAM:  The right TM is megan colored and distorted light reflex     The right auditory canal is normal and without drainage, edema or erythema  The left TM is megan colored and bubbles  The left auditory canal is normal and without drainage, edema or erythema  Oropharynx exam is normal: no lesions, erythema, adenopathy  or exudate.      ASSESSMENT:  1. Dysfunction of both eustachian tubes  Patient Instructions   earpressure is primarily a problem of drainage.  You can best help your body clear the sinus secretions and pressure by opening up the natural passageways which are often blocked by viral colds and allergies.      Short courses of a nasal decongestant spray (Afrin or Neosinephrine) are one of the most effective tools in opening sinus drainage passageways.  Their use should be restricted to 3 days though due to the high risk of nasal addiction.  Pseudoephedrine or phenylephrine (Sudafed) is often helpful but it can cause elevations in blood pressure and insomnia.     Sometimes a nasal saline spray will help rinse out the nasal passages and feel good.     Guaifenesin (Robitussin or Mucinex) helps loosen secretions and often help make the mucous more liquid and easier to clear.    For pain and fevers, acetaminophen (Tylenol) is most appropriate.  Ibuprofen (Advil) or naproxen (Aleve) are useful too and last longer but they can cause elevation of blood pressure or stomach problems.    Antihistamines (Benadryl, Dimetapp, etc.) cause sedation, confusion, bowel and urinary abnormalities and are of little use for infectious causes of cough and nasal congestion.  Their use should be reserved for allergic symptoms.    The body needs to be treated well in order to help heal itself.  Rest as needed.  It is ok to reduce food intake if appetite is poor but it is quite important to maintain/increase fluid intake.            2. Infection due to 2019 novel coronavirus    Tamia Price M.D.

## 2022-10-07 NOTE — PATIENT INSTRUCTIONS
earpressure is primarily a problem of drainage.  You can best help your body clear the sinus secretions and pressure by opening up the natural passageways which are often blocked by viral colds and allergies.      Short courses of a nasal decongestant spray (Afrin or Neosinephrine) are one of the most effective tools in opening sinus drainage passageways.  Their use should be restricted to 3 days though due to the high risk of nasal addiction.  Pseudoephedrine or phenylephrine (Sudafed) is often helpful but it can cause elevations in blood pressure and insomnia.     Sometimes a nasal saline spray will help rinse out the nasal passages and feel good.     Guaifenesin (Robitussin or Mucinex) helps loosen secretions and often help make the mucous more liquid and easier to clear.    For pain and fevers, acetaminophen (Tylenol) is most appropriate.  Ibuprofen (Advil) or naproxen (Aleve) are useful too and last longer but they can cause elevation of blood pressure or stomach problems.    Antihistamines (Benadryl, Dimetapp, etc.) cause sedation, confusion, bowel and urinary abnormalities and are of little use for infectious causes of cough and nasal congestion.  Their use should be reserved for allergic symptoms.    The body needs to be treated well in order to help heal itself.  Rest as needed.  It is ok to reduce food intake if appetite is poor but it is quite important to maintain/increase fluid intake.

## 2022-11-21 ENCOUNTER — HEALTH MAINTENANCE LETTER (OUTPATIENT)
Age: 37
End: 2022-11-21

## 2022-11-21 DIAGNOSIS — F41.1 GENERALIZED ANXIETY DISORDER: Primary | ICD-10-CM

## 2022-11-22 RX ORDER — DULOXETIN HYDROCHLORIDE 60 MG/1
CAPSULE, DELAYED RELEASE ORAL
Qty: 90 CAPSULE | Refills: 3 | Status: SHIPPED | OUTPATIENT
Start: 2022-11-22 | End: 2023-05-25

## 2022-12-12 ENCOUNTER — TRANSFERRED RECORDS (OUTPATIENT)
Dept: HEALTH INFORMATION MANAGEMENT | Facility: CLINIC | Age: 37
End: 2022-12-12

## 2023-04-13 ENCOUNTER — TRANSFERRED RECORDS (OUTPATIENT)
Dept: HEALTH INFORMATION MANAGEMENT | Facility: CLINIC | Age: 38
End: 2023-04-13
Payer: COMMERCIAL

## 2023-04-19 ENCOUNTER — TRANSFERRED RECORDS (OUTPATIENT)
Dept: HEALTH INFORMATION MANAGEMENT | Facility: CLINIC | Age: 38
End: 2023-04-19

## 2023-05-18 ENCOUNTER — VIRTUAL VISIT (OUTPATIENT)
Dept: FAMILY MEDICINE | Facility: CLINIC | Age: 38
End: 2023-05-18
Payer: COMMERCIAL

## 2023-05-18 DIAGNOSIS — M25.50 MULTIPLE JOINT PAIN: Primary | ICD-10-CM

## 2023-05-18 PROCEDURE — 99213 OFFICE O/P EST LOW 20 MIN: CPT | Mod: VID | Performed by: NURSE PRACTITIONER

## 2023-05-18 NOTE — PROGRESS NOTES
Omayra is a 38 year old who is being evaluated via a billable video visit.      How would you like to obtain your AVS? MyChart  If the video visit is dropped, the invitation should be resent by: Text to cell phone: [phone]  Will anyone else be joining your video visit? No        Assessment & Plan     (M25.50) Multiple joint pain  (primary encounter diagnosis)  Comment: [facility] did have a positive CANDIS they recommend follow-up with rheumatology.  I placed an referral for rheumatology and will obtain records if she had all lab work done at that time  Plan: Adult Rheumatology  Referral      Krysta Webster, APRN CNP  [facility]    Subjective   Omayra is a 38 year old, presenting for the following health issues:  Referral and Finger        5/18/2023     1:54 PM   Additional Questions   Roomed by Cecy FUNEZ     Patient would like a referral for rheumatology for her hands. Three of her fingers turned black and painful a few days ago. She has also had a lot of swelling. She tested positive for CANDIS a few weeks ago at [facility].           Review of Systems   Constitutional, HEENT, cardiovascular, pulmonary, gi and gu systems are negative, except as otherwise noted.      Objective           Vitals:  No vitals were obtained today due to virtual visit.    Physical Exam   GENERAL: Healthy, alert and no distress  EYES: Eyes grossly normal to inspection.  No discharge or erythema, or obvious scleral/conjunctival abnormalities.  RESP: No audible wheeze, cough, or visible cyanosis.  No visible retractions or increased work of breathing.    SKIN: Visible skin clear. No significant rash, abnormal pigmentation or lesions.  NEURO: Cranial nerves grossly intact.  Mentation and speech appropriate for age.  PSYCH: Mentation appears normal, affect normal/bright, judgement and insight intact, normal speech and appearance well-groomed.    No results found for any  visits on 05/18/23.            Video-Visit Details    Type of service:  Video Visit     Originating Location (pt. Location): Home    Distant Location (provider location):  On-site  Platform used for Video Visit: Anuel

## 2023-05-26 DIAGNOSIS — F41.1 GENERALIZED ANXIETY DISORDER: ICD-10-CM

## 2023-05-26 RX ORDER — DULOXETIN HYDROCHLORIDE 60 MG/1
CAPSULE, DELAYED RELEASE ORAL
Qty: 90 CAPSULE | Refills: 0 | Status: SHIPPED | OUTPATIENT
Start: 2023-05-26 | End: 2023-08-29

## 2023-06-05 ENCOUNTER — OFFICE VISIT (OUTPATIENT)
Dept: RHEUMATOLOGY | Facility: CLINIC | Age: 38
End: 2023-06-05
Attending: NURSE PRACTITIONER
Payer: COMMERCIAL

## 2023-06-05 VITALS
SYSTOLIC BLOOD PRESSURE: 111 MMHG | HEART RATE: 83 BPM | OXYGEN SATURATION: 97 % | DIASTOLIC BLOOD PRESSURE: 68 MMHG | WEIGHT: 182 LBS | BODY MASS INDEX: 31.99 KG/M2

## 2023-06-05 DIAGNOSIS — M25.562 CHRONIC PAIN OF BOTH KNEES: ICD-10-CM

## 2023-06-05 DIAGNOSIS — R76.8 POSITIVE ANA (ANTINUCLEAR ANTIBODY): Primary | ICD-10-CM

## 2023-06-05 DIAGNOSIS — M54.12 RADICULOPATHY, CERVICAL: ICD-10-CM

## 2023-06-05 DIAGNOSIS — G89.29 CHRONIC PAIN OF BOTH KNEES: ICD-10-CM

## 2023-06-05 DIAGNOSIS — M25.552 PAIN OF LEFT HIP: ICD-10-CM

## 2023-06-05 DIAGNOSIS — M25.50 MULTIPLE JOINT PAIN: ICD-10-CM

## 2023-06-05 DIAGNOSIS — M25.561 CHRONIC PAIN OF BOTH KNEES: ICD-10-CM

## 2023-06-05 LAB
ALBUMIN MFR UR ELPH: 13.4 MG/DL (ref 1–14)
ALBUMIN SERPL BCG-MCNC: 4.4 G/DL (ref 3.5–5.2)
ALBUMIN UR-MCNC: NEGATIVE MG/DL
ALP SERPL-CCNC: 52 U/L (ref 35–104)
ALT SERPL W P-5'-P-CCNC: 15 U/L (ref 10–35)
ANION GAP SERPL CALCULATED.3IONS-SCNC: 10 MMOL/L (ref 7–15)
APPEARANCE UR: CLEAR
AST SERPL W P-5'-P-CCNC: 21 U/L (ref 10–35)
BASOPHILS # BLD AUTO: 0 10E3/UL (ref 0–0.2)
BASOPHILS NFR BLD AUTO: 0 %
BILIRUB SERPL-MCNC: 0.2 MG/DL
BILIRUB UR QL STRIP: NEGATIVE
BUN SERPL-MCNC: 14.7 MG/DL (ref 6–20)
CALCIUM SERPL-MCNC: 9.2 MG/DL (ref 8.6–10)
CHLORIDE SERPL-SCNC: 105 MMOL/L (ref 98–107)
CK SERPL-CCNC: 161 U/L (ref 26–192)
COLOR UR AUTO: YELLOW
CREAT SERPL-MCNC: 0.91 MG/DL (ref 0.51–0.95)
CREAT UR-MCNC: 241 MG/DL
CRP SERPL-MCNC: <3 MG/L
DEPRECATED HCO3 PLAS-SCNC: 25 MMOL/L (ref 22–29)
EOSINOPHIL # BLD AUTO: 0.1 10E3/UL (ref 0–0.7)
EOSINOPHIL NFR BLD AUTO: 2 %
ERYTHROCYTE [DISTWIDTH] IN BLOOD BY AUTOMATED COUNT: 12.5 % (ref 10–15)
ERYTHROCYTE [SEDIMENTATION RATE] IN BLOOD BY WESTERGREN METHOD: 6 MM/HR (ref 0–20)
GFR SERPL CREATININE-BSD FRML MDRD: 83 ML/MIN/1.73M2
GLUCOSE SERPL-MCNC: 84 MG/DL (ref 70–99)
GLUCOSE UR STRIP-MCNC: NEGATIVE MG/DL
HCT VFR BLD AUTO: 39.7 % (ref 35–47)
HGB BLD-MCNC: 12.8 G/DL (ref 11.7–15.7)
HGB UR QL STRIP: NEGATIVE
IMM GRANULOCYTES # BLD: 0 10E3/UL
IMM GRANULOCYTES NFR BLD: 0 %
KETONES UR STRIP-MCNC: ABNORMAL MG/DL
LEUKOCYTE ESTERASE UR QL STRIP: NEGATIVE
LYMPHOCYTES # BLD AUTO: 1.9 10E3/UL (ref 0.8–5.3)
LYMPHOCYTES NFR BLD AUTO: 42 %
MCH RBC QN AUTO: 28.5 PG (ref 26.5–33)
MCHC RBC AUTO-ENTMCNC: 32.2 G/DL (ref 31.5–36.5)
MCV RBC AUTO: 88 FL (ref 78–100)
MONOCYTES # BLD AUTO: 0.3 10E3/UL (ref 0–1.3)
MONOCYTES NFR BLD AUTO: 7 %
NEUTROPHILS # BLD AUTO: 2.2 10E3/UL (ref 1.6–8.3)
NEUTROPHILS NFR BLD AUTO: 48 %
NITRATE UR QL: NEGATIVE
PH UR STRIP: 5.5 [PH] (ref 5–7)
PLATELET # BLD AUTO: 292 10E3/UL (ref 150–450)
POTASSIUM SERPL-SCNC: 4.5 MMOL/L (ref 3.4–5.3)
PROT SERPL-MCNC: 7.1 G/DL (ref 6.4–8.3)
PROT/CREAT 24H UR: 0.06 MG/MG CR (ref 0–0.2)
RBC # BLD AUTO: 4.49 10E6/UL (ref 3.8–5.2)
SODIUM SERPL-SCNC: 140 MMOL/L (ref 136–145)
SP GR UR STRIP: >=1.03 (ref 1–1.03)
UROBILINOGEN UR STRIP-ACNC: 0.2 E.U./DL
WBC # BLD AUTO: 4.5 10E3/UL (ref 4–11)

## 2023-06-05 PROCEDURE — 82550 ASSAY OF CK (CPK): CPT | Performed by: INTERNAL MEDICINE

## 2023-06-05 PROCEDURE — 85390 FIBRINOLYSINS SCREEN I&R: CPT | Performed by: PATHOLOGY

## 2023-06-05 PROCEDURE — 80053 COMPREHEN METABOLIC PANEL: CPT | Performed by: INTERNAL MEDICINE

## 2023-06-05 PROCEDURE — 85730 THROMBOPLASTIN TIME PARTIAL: CPT | Performed by: INTERNAL MEDICINE

## 2023-06-05 PROCEDURE — 85025 COMPLETE CBC W/AUTO DIFF WBC: CPT | Performed by: INTERNAL MEDICINE

## 2023-06-05 PROCEDURE — 84156 ASSAY OF PROTEIN URINE: CPT | Performed by: INTERNAL MEDICINE

## 2023-06-05 PROCEDURE — 86140 C-REACTIVE PROTEIN: CPT | Performed by: INTERNAL MEDICINE

## 2023-06-05 PROCEDURE — 86235 NUCLEAR ANTIGEN ANTIBODY: CPT | Performed by: INTERNAL MEDICINE

## 2023-06-05 PROCEDURE — 86160 COMPLEMENT ANTIGEN: CPT | Performed by: INTERNAL MEDICINE

## 2023-06-05 PROCEDURE — 86039 ANTINUCLEAR ANTIBODIES (ANA): CPT | Performed by: INTERNAL MEDICINE

## 2023-06-05 PROCEDURE — 86147 CARDIOLIPIN ANTIBODY EA IG: CPT | Performed by: INTERNAL MEDICINE

## 2023-06-05 PROCEDURE — 85652 RBC SED RATE AUTOMATED: CPT | Performed by: INTERNAL MEDICINE

## 2023-06-05 PROCEDURE — 81003 URINALYSIS AUTO W/O SCOPE: CPT | Performed by: INTERNAL MEDICINE

## 2023-06-05 PROCEDURE — 86146 BETA-2 GLYCOPROTEIN ANTIBODY: CPT | Performed by: INTERNAL MEDICINE

## 2023-06-05 PROCEDURE — 85613 RUSSELL VIPER VENOM DILUTED: CPT | Performed by: INTERNAL MEDICINE

## 2023-06-05 PROCEDURE — 99204 OFFICE O/P NEW MOD 45 MIN: CPT | Performed by: INTERNAL MEDICINE

## 2023-06-05 PROCEDURE — 86225 DNA ANTIBODY NATIVE: CPT | Performed by: INTERNAL MEDICINE

## 2023-06-05 PROCEDURE — 36415 COLL VENOUS BLD VENIPUNCTURE: CPT | Performed by: INTERNAL MEDICINE

## 2023-06-05 PROCEDURE — 86038 ANTINUCLEAR ANTIBODIES: CPT | Performed by: INTERNAL MEDICINE

## 2023-06-05 RX ORDER — PROGESTERONE 100 MG/1
CAPSULE ORAL
COMMUNITY
Start: 2023-05-25 | End: 2023-09-25

## 2023-06-05 NOTE — PATIENT INSTRUCTIONS
RHEUMATOLOGY    [facility]  [address]  [place] [address]    Phone number: [phone]  Fax number: [phone]      Thank you for choosing [facility]!    Damaris Boogie CMA

## 2023-06-05 NOTE — PROGRESS NOTES
Rheumatology Clinic Visit      Omayra Cadena MRN# [id number]   YOB: 1985 Age: 38 year old      Date of visit: 6/05/23   Referring provider: Krysta Webster  PCP: Krysta Webster    Chief Complaint   Patient presents with:  Consult: Has multiple joint pain    Assessment and Plan     1. Positive CANDIS (antinuclear antibody): CANDIS 1:80 based on outside records shown by the patient on her phone.  Oral sores that reportedly occur at the same location on her gums repeatedly,  by periods of no sores.  No oral sores seen on exam today.  Reportedly sores were prednisone responsive; she has never been on an anti-infective for the sores such as valacyclovir.  Oral sores have been occurring for as long as she can remember.  Other than the oral sores she does not have other symptoms to suggest an CANDIS-associated rheumatologic disorder.  Further work-up as noted below  - Labs today: CBC, CMP, ESR, CRP, CK, CANDIS, AMBROCIO, dsDNA, APS labs, C3, C4, UA, Uprotein:creatinine    2.  Chronic pain of both knees: Symptoms of clicking and occasionally giving out.  Occurs bilaterally.  Symptoms are not suggestive of an inflammatory arthritis.  Suspect meniscal issue.  Advised physical therapy.  - PT referral    3.  Pain at the left ASIS: Advised physical therapy  - PT referral    4.  Cervical radiculopathy, rating to the left arm: Suspect due to degenerative changes of the cervical spine.  Reportedly mild degenerative changes were seen on an MRI performed by her neurologist but I do not have results of this.  Advised physical therapy  - PT referral    5.  Discoloration of the fingers: Normal nailfold capillaroscopy on 6/5/2023.  Characteristics of discoloration that occur with warm or cold exposure is not consistent with Raynaud's phenomenon, and in the blue discoloration of the left third finger occurred only once and appeared as a bruise that did not extend to the distal aspect of the finger, only occurring on the palmar aspect  "of the proximal half of the distal left third phalanx that resolved after about 2 days.  No further rheumatology work-up for this issue at this time.    Total minutes spent in evaluation with patient, documentation, , and review of pertinent studies and chart notes: 54     Ms. Cadena verbalized agreement with and understanding of the rational for the diagnosis and treatment plan.  All questions were answered to best of my ability and the patient's satisfaction. Ms. Cadena was advised to contact the clinic with any questions that may arise after the clinic visit.      Thank you for involving me in the care of the patient    Return to clinic: 1-2 weeks      HPI   Omayra Cadena is a 38 year old female with a past medical history significant for migraines and anxiety who presents for initial rheumatology evaluation of multiple joint pain.    5/18/2023 family medicine note by Krysta Webster documents \"[facility] did have a positive CANDIS they recommend follow-up with rheumatology.\"  Pain in her hands; 3 of her fingers turned black and painful a few days ago.  Lots of swelling.  Positive CANDIS at [facility].    4/13/2023 [facility] note by Dr. Matthew Fu documents that the patient was having left-sided body issues including left hip was hurting after walking a mile, left-sided cramping behind the ear and headaches focused behind the left eye, legs being swollen but the right side foot is more swollen than the left, constipation, tinnitus, difficulty with attention, stress incontinence (bladder), dizziness when trying to focus, and poor balance.  Vitamin D was low so she is now on vitamin D 5000 IU daily.  Has been seen by GI for constipation.  Paternal and maternal grandmothers with multiple sclerosis.  No neurologic deficit seen on exam MRI of the brain and spinal cord planned.    Today, 6/5/2023: Omayra Cadena reports that the MRIs from [facility] " "were negative for findings of multiple sclerosis.  The CANDIS .  States that her teeth were bothering her for years and one was bothersome so she had a tooth removed and everywhere that a needle was used on the gums she had a canker sore. For the dental procedure she reports that she had a bone graft with plans for dental implant. Terrible dental pain and was given antibiotics from her dentist with no improvement, then steroids that took the pain away but subsequently the implant had shifted and fell out.  Has plans to replace the dental implant in the future.  The steroid course was medrol dosepak. Has had canker sores that last a week each time for as long as she can remember. The dentist commented that this can happen if autoimmune disease so she had an CANDIS checked.  Has pain on the left shoulder/knee/hip. Goes to a chiropractor for manipulation that results in improvement for about 1 day.  When walking her dog she gets a clicking of the hip and she points in the location of the ASIS.  No back pain.  Left shoulder pain when waking up only; doesn't matter if she lays on her right or left side; resolves within 30 min of ibuprofen 800 mg that is taken almost every morning; if no ibuprofen then the shoulder pain may linger.   Bilateral knee clicking; sometimes feels like they are going to give out. Bilateral upper arm rash on occasion; sometimes \"weird circles\" that are rough and red that last a week, on and off x10 years. Once when doing the dishes she says that her left 3rd finger just distal to the DIP but not extending to the fingertip turned blue (picture shown that looked bruised); resolved after 2 days.  Fingers turn red and purple when scrubbing in, warm and cold water the same reaction.     She shows me on her phone records from [facility] [place] dated 4/17/2023: ESR and CRP normal, CANDIS 1:80 homogeneous    She also shows records of a 5/10/2023 TSH 1.29    Denies fevers, chills, nausea, vomiting, diarrhea. " "Constipation chronically; [facility] - was started on \"thyroid complex\" with some improvement, noting that this is not levothyroxine.. No fatigue.   No abdominal pain. No blood in urine or stool. . No chest pain/pressure, palpitations, or shortness of breath. No LE swelling. No neck pain. No oral or nasal sores.   No sicca symptoms. No photosensitivity or photophobia. No eye pain or redness. No history of inflammatory eye disease.  No history of inflammatory bowel disease.  No history of DVT, pulmonary embolism, or miscarriage.   No history of serositis.  No known seizure disorder.  No known renal disorder.  No UTI symptoms currently.    Sister: Lichen planus (axilla, groin)  Maternal aunt: hyperthyroidism, lichen planus (oral), raynauds  Maternal grandmother: MS  Paternal grandmother: MS    Tobacco: vape  EtOH: none  Drugs: none  Occupation: surgical     ROS   12 point review of system was completed and negative except as noted in the HPI     Active Problem List     Patient Active Problem List   Diagnosis     Tobacco use disorder     CARDIOVASCULAR SCREENING; LDL GOAL LESS THAN 160     Intractable migraine without aura and with status migrainosus     Generalized anxiety disorder     Past Medical History     Past Medical History:   Diagnosis Date     Allergic rhinitis due to other allergen      Past Surgical History     Past Surgical History:   Procedure Laterality Date     NO HISTORY OF SURGERY       Allergy   No Known Allergies  Current Medication List     Current Outpatient Medications   Medication Sig     DULoxetine (CYMBALTA) 60 MG capsule TAKE 1 CAPSULE (60 MG) BY MOUTH DAILY     fexofenadine (ALLEGRA) 180 MG tablet Take 180 mg by mouth daily     rizatriptan (MAXALT) 10 MG tablet Take 1 tablet (10 mg) by mouth at onset of headache for migraine May repeat in 2 hours. Max 3 tablets/24 hours.     SUMAtriptan (IMITREX) 25 MG tablet Take 1 tablet (25 mg) by mouth at onset of headache for " "migraine May repeat in 2 hours. Max 8 tablets/24 hours. (Patient not taking: Reported on 6/5/2023)     No current facility-administered medications for this visit.         Social History   See HPI    Family History     Family History   Problem Relation Age of Onset     Cerebrovascular Disease Father      Musculoskeletal Disorder Maternal Grandmother         ms     Musculoskeletal Disorder Paternal Grandmother         ms     See HPI    Physical Exam     Temp Readings from Last 3 Encounters:   10/07/22 97.8  F (36.6  C) (Tympanic)   09/09/21 97  F (36.1  C) (Tympanic)   01/15/20 98.2  F (36.8  C) (Tympanic)     BP Readings from Last 5 Encounters:   10/07/22 103/70   09/09/21 110/70   01/15/20 128/70   08/06/19 100/70   05/09/19 116/60     Pulse Readings from Last 1 Encounters:   10/07/22 70     Resp Readings from Last 1 Encounters:   01/15/20 18     Estimated body mass index is 31.11 kg/m  as calculated from the following:    Height as of 9/9/21: 1.607 m (5' 3.25\").    Weight as of 9/9/21: 80.3 kg (177 lb).      GEN: NAD. Healthy appearing adult.   HEENT:  Anicteric, noninjected sclera. No obvious external lesions of the ear and nose. Hearing intact.  CV: S1, S2. RRR. No m/r/g  PULM: No increased work of breathing. CTA bilaterally   MSK: MCPs, PIPs, DIPs without swelling or tenderness to palpation.  Wrists without swelling or tenderness to palpation.  Elbows and shoulders without swelling or tenderness to palpation.  No pain with cervical spine range of motion testing.  Shoulders with normal range of motion.  Hips nontender to palpation.  Mildly tender to palpation over the left anterior superior iliac spine.  Knees, ankles, and MTPs without swelling or tenderness to palpation.    SKIN: No rash or jaundice seen.  Normal finger nailfold capillaroscopy  PSYCH: Alert. Appropriate.        Labs / Imaging (select studies)     CBC  Recent Labs   Lab Test 08/06/19  0948   WBC 6.7   RBC 4.49   HGB 12.9   HCT 41.1   MCV 92 "   RDW 13.9      MCH 28.7   MCHC 31.4*   NEUTROPHIL 63.5   LYMPH 26.6   MONOCYTE 6.7   EOSINOPHIL 3.1   BASOPHIL 0.1   ANEU 4.3   ALYM 1.8   KYM 0.5   AEOS 0.2   ABAS 0.0     CMP  Recent Labs   Lab Test 09/09/21  0848 08/06/19  0948    138   POTASSIUM 4.0 4.3   CHLORIDE 107 106   CO2 28 28   ANIONGAP 3 4   GLC 83 91   BUN 12 11   CR 0.88 0.80   GFRESTIMATED 85 >90   GFRESTBLACK  --  >90   EVA 8.2* 8.1*   BILITOTAL 0.2 0.2   ALBUMIN 3.8 3.3*   PROTTOTAL 6.7* 6.0*   ALKPHOS 52 49   AST 17 19   ALT 26 28     Calcium/VitaminD  Recent Labs   Lab Test 09/09/21  0848 08/06/19  0948   EVA 8.2* 8.1*     TSH/T4  Recent Labs   Lab Test 09/09/21  0848 08/06/19  0948   TSH 0.70 1.25     Immunization History     Immunization History   Administered Date(s) Administered     COVID-19 Monovalent 18+ (Moderna) 09/09/2021, 10/11/2021     Rabies-Intradermal 07/21/2006, 07/28/2006, 08/11/2006     TDAP (Adacel,Boostrix) 04/05/2013          Chart documentation done in part with Dragon Voice recognition Software. Although reviewed after completion, some word and grammatical error may remain.    Sergio Cueto MD

## 2023-06-06 LAB
ANA PAT SER IF-IMP: ABNORMAL
ANA SER QL IF: POSITIVE
ANA TITR SER IF: ABNORMAL {TITER}
B2 GLYCOPROT1 IGG SERPL IA-ACNC: <0.8 U/ML
B2 GLYCOPROT1 IGM SERPL IA-ACNC: 6.1 U/ML
C3 SERPL-MCNC: 105 MG/DL (ref 81–157)
C4 SERPL-MCNC: 21 MG/DL (ref 13–39)
CARDIOLIPIN IGG SER IA-ACNC: <2 GPL-U/ML
CARDIOLIPIN IGG SER IA-ACNC: NEGATIVE
CARDIOLIPIN IGM SER IA-ACNC: <2 MPL-U/ML
CARDIOLIPIN IGM SER IA-ACNC: NEGATIVE
DRVVT SCREEN RATIO: 0.85
DSDNA AB SER-ACNC: <0.6 IU/ML
ENA SM IGG SER IA-ACNC: <0.7 U/ML
ENA SM IGG SER IA-ACNC: NEGATIVE
ENA SS-A AB SER IA-ACNC: <0.5 U/ML
ENA SS-A AB SER IA-ACNC: NEGATIVE
ENA SS-B IGG SER IA-ACNC: <0.6 U/ML
ENA SS-B IGG SER IA-ACNC: NEGATIVE
INR PPP: 1.03 (ref 0.85–1.15)
LA PPP-IMP: NEGATIVE
LUPUS INTERPRETATION: NORMAL
PTT RATIO: 1.05
THROMBIN TIME: 16.2 SECONDS (ref 13–19)
U1 SNRNP IGG SER IA-ACNC: <1.1 U/ML
U1 SNRNP IGG SER IA-ACNC: NEGATIVE

## 2023-07-09 ENCOUNTER — HEALTH MAINTENANCE LETTER (OUTPATIENT)
Age: 38
End: 2023-07-09

## 2023-09-18 ASSESSMENT — ENCOUNTER SYMPTOMS
SHORTNESS OF BREATH: 0
HEMATOCHEZIA: 0
MYALGIAS: 0
CONSTIPATION: 1
DIZZINESS: 0
DIARRHEA: 0
HEADACHES: 1
PALPITATIONS: 0
PARESTHESIAS: 0
COUGH: 0
SORE THROAT: 0
CHILLS: 0
ARTHRALGIAS: 0
NERVOUS/ANXIOUS: 0
EYE PAIN: 0
BREAST MASS: 0
JOINT SWELLING: 0
HEMATURIA: 0
WEAKNESS: 0
FREQUENCY: 0
NAUSEA: 0
ABDOMINAL PAIN: 0
HEARTBURN: 1
FEVER: 0
DYSURIA: 0

## 2023-09-23 NOTE — TELEPHONE ENCOUNTER
Krysta,    Patient was called, she says you decided to increase her Cymbalta to 80 mg daily, but the sig on the 20 mg Cymbalta says to take it BID, could you please clarify this, have the script pended, thank you. Patient would like a detailed voicemail on her mobile if not able to reach her.    GILBERT Leary        
Reason for Call:  Other     Detailed comments: Patient has questions on the dosage of the medication below -      Disp Refills Start End CORIE   DULoxetine (CYMBALTA) 20 MG capsule 90 capsule 1 11/10/2020  --   Sig - Route: Take 1 capsule (20 mg) by mouth 2 times daily Take with the 60 for a total of 80 mg - Oral   Sent to pharmacy as: DULoxetine HCl 20 MG Oral Capsule Delayed Release Particles (CYMBALTA)   Class: E-Prescribe         Phone Number Patient can be reached at: Home number on file [phone] (home)    Best Time:     Can we leave a detailed message on this number? YES    Call taken on 11/11/2020 at 3:46 PM by Rosa Maria Mc      
Observation and assessment/Teaching and training/Wound care and assessment/Other, specify...

## 2023-09-25 ENCOUNTER — OFFICE VISIT (OUTPATIENT)
Dept: FAMILY MEDICINE | Facility: CLINIC | Age: 38
End: 2023-09-25
Payer: COMMERCIAL

## 2023-09-25 VITALS
TEMPERATURE: 97.5 F | HEIGHT: 64 IN | BODY MASS INDEX: 31.86 KG/M2 | DIASTOLIC BLOOD PRESSURE: 70 MMHG | HEART RATE: 77 BPM | RESPIRATION RATE: 18 BRPM | SYSTOLIC BLOOD PRESSURE: 102 MMHG | OXYGEN SATURATION: 98 % | WEIGHT: 186.6 LBS

## 2023-09-25 DIAGNOSIS — F41.1 GENERALIZED ANXIETY DISORDER: ICD-10-CM

## 2023-09-25 DIAGNOSIS — M72.2 PLANTAR FASCIITIS: ICD-10-CM

## 2023-09-25 DIAGNOSIS — E66.09 CLASS 1 OBESITY DUE TO EXCESS CALORIES WITHOUT SERIOUS COMORBIDITY WITH BODY MASS INDEX (BMI) OF 32.0 TO 32.9 IN ADULT: ICD-10-CM

## 2023-09-25 DIAGNOSIS — E66.811 CLASS 1 OBESITY DUE TO EXCESS CALORIES WITHOUT SERIOUS COMORBIDITY WITH BODY MASS INDEX (BMI) OF 32.0 TO 32.9 IN ADULT: ICD-10-CM

## 2023-09-25 DIAGNOSIS — Z12.4 CERVICAL CANCER SCREENING: ICD-10-CM

## 2023-09-25 DIAGNOSIS — Z00.00 ROUTINE GENERAL MEDICAL EXAMINATION AT A HEALTH CARE FACILITY: Primary | ICD-10-CM

## 2023-09-25 LAB
ALBUMIN SERPL BCG-MCNC: 4.3 G/DL (ref 3.5–5.2)
ALP SERPL-CCNC: 56 U/L (ref 35–104)
ALT SERPL W P-5'-P-CCNC: 19 U/L (ref 0–50)
ANION GAP SERPL CALCULATED.3IONS-SCNC: 10 MMOL/L (ref 7–15)
AST SERPL W P-5'-P-CCNC: 18 U/L (ref 0–45)
BILIRUB SERPL-MCNC: 0.2 MG/DL
BUN SERPL-MCNC: 14.2 MG/DL (ref 6–20)
CALCIUM SERPL-MCNC: 9.3 MG/DL (ref 8.6–10)
CHLORIDE SERPL-SCNC: 104 MMOL/L (ref 98–107)
CREAT SERPL-MCNC: 0.87 MG/DL (ref 0.51–0.95)
DEPRECATED HCO3 PLAS-SCNC: 24 MMOL/L (ref 22–29)
EGFRCR SERPLBLD CKD-EPI 2021: 88 ML/MIN/1.73M2
ERYTHROCYTE [DISTWIDTH] IN BLOOD BY AUTOMATED COUNT: 13 % (ref 10–15)
GLUCOSE SERPL-MCNC: 88 MG/DL (ref 70–99)
HCT VFR BLD AUTO: 38.8 % (ref 35–47)
HGB BLD-MCNC: 12.6 G/DL (ref 11.7–15.7)
MCH RBC QN AUTO: 28.4 PG (ref 26.5–33)
MCHC RBC AUTO-ENTMCNC: 32.5 G/DL (ref 31.5–36.5)
MCV RBC AUTO: 87 FL (ref 78–100)
PLATELET # BLD AUTO: 261 10E3/UL (ref 150–450)
POTASSIUM SERPL-SCNC: 4.7 MMOL/L (ref 3.4–5.3)
PROT SERPL-MCNC: 6.7 G/DL (ref 6.4–8.3)
RBC # BLD AUTO: 4.44 10E6/UL (ref 3.8–5.2)
SODIUM SERPL-SCNC: 138 MMOL/L (ref 136–145)
TSH SERPL DL<=0.005 MIU/L-ACNC: 0.91 UIU/ML (ref 0.3–4.2)
WBC # BLD AUTO: 7.2 10E3/UL (ref 4–11)

## 2023-09-25 PROCEDURE — 99395 PREV VISIT EST AGE 18-39: CPT | Performed by: NURSE PRACTITIONER

## 2023-09-25 PROCEDURE — 99213 OFFICE O/P EST LOW 20 MIN: CPT | Mod: 25 | Performed by: NURSE PRACTITIONER

## 2023-09-25 PROCEDURE — 36415 COLL VENOUS BLD VENIPUNCTURE: CPT | Performed by: NURSE PRACTITIONER

## 2023-09-25 PROCEDURE — G0145 SCR C/V CYTO,THINLAYER,RESCR: HCPCS | Performed by: NURSE PRACTITIONER

## 2023-09-25 PROCEDURE — 84443 ASSAY THYROID STIM HORMONE: CPT | Performed by: NURSE PRACTITIONER

## 2023-09-25 PROCEDURE — 87624 HPV HI-RISK TYP POOLED RSLT: CPT | Performed by: NURSE PRACTITIONER

## 2023-09-25 PROCEDURE — 80053 COMPREHEN METABOLIC PANEL: CPT | Performed by: NURSE PRACTITIONER

## 2023-09-25 PROCEDURE — 85027 COMPLETE CBC AUTOMATED: CPT | Performed by: NURSE PRACTITIONER

## 2023-09-25 RX ORDER — LYSINE HCL 500 MG
500 TABLET ORAL DAILY
COMMUNITY
End: 2024-03-21

## 2023-09-25 RX ORDER — DULOXETIN HYDROCHLORIDE 60 MG/1
60 CAPSULE, DELAYED RELEASE ORAL DAILY
Qty: 90 CAPSULE | Refills: 3 | Status: SHIPPED | OUTPATIENT
Start: 2023-09-25 | End: 2024-03-21

## 2023-09-25 ASSESSMENT — ANXIETY QUESTIONNAIRES
6. BECOMING EASILY ANNOYED OR IRRITABLE: MORE THAN HALF THE DAYS
2. NOT BEING ABLE TO STOP OR CONTROL WORRYING: NOT AT ALL
7. FEELING AFRAID AS IF SOMETHING AWFUL MIGHT HAPPEN: NOT AT ALL
5. BEING SO RESTLESS THAT IT IS HARD TO SIT STILL: NOT AT ALL
3. WORRYING TOO MUCH ABOUT DIFFERENT THINGS: SEVERAL DAYS
GAD7 TOTAL SCORE: 5
GAD7 TOTAL SCORE: 5
1. FEELING NERVOUS, ANXIOUS, OR ON EDGE: MORE THAN HALF THE DAYS

## 2023-09-25 ASSESSMENT — ENCOUNTER SYMPTOMS
DYSURIA: 0
PARESTHESIAS: 0
DIZZINESS: 0
HEMATOCHEZIA: 0
FREQUENCY: 0
SORE THROAT: 0
NAUSEA: 0
WEAKNESS: 0
HEADACHES: 1
HEMATURIA: 0
HEARTBURN: 1
EYE PAIN: 0
FEVER: 0
JOINT SWELLING: 0
CONSTIPATION: 1
ARTHRALGIAS: 0
COUGH: 0
PALPITATIONS: 0
SHORTNESS OF BREATH: 0
MYALGIAS: 0
NERVOUS/ANXIOUS: 0
BREAST MASS: 0
CHILLS: 0
ABDOMINAL PAIN: 0
DIARRHEA: 0

## 2023-09-25 ASSESSMENT — PATIENT HEALTH QUESTIONNAIRE - PHQ9
5. POOR APPETITE OR OVEREATING: NOT AT ALL
SUM OF ALL RESPONSES TO PHQ QUESTIONS 1-9: 5

## 2023-09-25 ASSESSMENT — PAIN SCALES - GENERAL: PAINLEVEL: NO PAIN (0)

## 2023-09-25 NOTE — PROGRESS NOTES
SUBJECTIVE:   CC: Omayra is an 38 year old who presents for preventive health visit.       9/25/2023     9:26 AM   Additional Questions   Roomed by Vilma CONNOLLY CMA     Healthy Habits:     Getting at least 3 servings of Calcium per day:  Yes    Bi-annual eye exam:  NO    Dental care twice a year:  Yes    Sleep apnea or symptoms of sleep apnea:  Daytime drowsiness and Excessive snoring    Diet:  Regular (no restrictions)    Frequency of exercise:  2-3 days/week    Duration of exercise:  15-30 minutes    Taking medications regularly:  Yes    Medication side effects:  None    Additional concerns today:  Yes    Heel pain     Duration: 6 months   Description (location/character/radiation): right heel pain   Intensity:  Starts out mild early in the day. Severe by the end of the day   Accompanying signs and symptoms: none   History (similar episodes/previous evaluation): None  Precipitating or alleviating factors: None  Therapies tried and outcome: Stretching using a lacrosse ball       Have you ever done Advance Care Planning? (For example, a Health Directive, POLST, or a discussion with a medical provider or your loved ones about your wishes): No, advance care planning information given to patient to review.  Patient plans to discuss their wishes with loved ones or provider.      Social History     Tobacco Use    Smoking status: Former     Types: Other    Smokeless tobacco: Current    Tobacco comments:     e-cig   Substance Use Topics    Alcohol use: Not Currently     Comment: once every 6 months             9/18/2023     2:01 PM   Alcohol Use   Prescreen: >3 drinks/day or >7 drinks/week? Not Applicable          No data to display              Reviewed orders with patient.  Reviewed health maintenance and updated orders accordingly - Yes  BP Readings from Last 3 Encounters:   09/25/23 102/70   06/05/23 111/68   10/07/22 103/70    Wt Readings from Last 3 Encounters:   09/25/23 84.6 kg (186 lb 9.6 oz)   06/05/23 82.6 kg  (182 lb)   21 80.3 kg (177 lb)                  Patient Active Problem List   Diagnosis    Tobacco use disorder    CARDIOVASCULAR SCREENING; LDL GOAL LESS THAN 160    Intractable migraine without aura and with status migrainosus    Generalized anxiety disorder     Past Surgical History:   Procedure Laterality Date    NO HISTORY OF SURGERY         Social History     Tobacco Use    Smoking status: Former     Types: Other    Smokeless tobacco: Current    Tobacco comments:     e-cig   Substance Use Topics    Alcohol use: Not Currently     Comment: once every 6 months     Family History   Problem Relation Age of Onset    Cerebrovascular Disease Father     Musculoskeletal Disorder Maternal Grandmother         ms    Musculoskeletal Disorder Paternal Grandmother         ms    Cerebrovascular Disease Paternal Grandmother          Current Outpatient Medications   Medication Sig Dispense Refill    Calcium Saccharate (CALCIUM D-GLUCARATE PO)       DULoxetine (CYMBALTA) 60 MG capsule Take 1 capsule (60 mg) by mouth daily 90 capsule 3    fexofenadine (ALLEGRA) 180 MG tablet Take 180 mg by mouth daily      l-lysine HCl 500 MG TABS tablet Take 500 mg by mouth daily      MAGNESIUM GLYCINATE PO       NEW MED Super B Complex      Omega-3 Fatty Acids (FISH OIL PO)       rizatriptan (MAXALT) 10 MG tablet Take 1 tablet (10 mg) by mouth at onset of headache for migraine May repeat in 2 hours. Max 3 tablets/24 hours. 9 tablet 0    Semaglutide-Weight Management (WEGOVY) 0.25 MG/0.5ML pen Inject 0.25 mg Subcutaneous once a week 2 mL 0    Vitamin D-Vitamin K (VITAMIN K2-VITAMIN D3 PO)        No Known Allergies  Recent Labs   Lab Test 23  1027 21  0848 19  0948   ALT 15 26 28   CR 0.91 0.88 0.80   GFRESTIMATED 83 85 >90   GFRESTBLACK  --   --  >90   POTASSIUM 4.5 4.0 4.3   TSH  --  0.70 1.25        Breast Cancer Screenin/18/2023     2:01 PM   Breast CA Risk Assessment (FHS-7)   Do you have a family history of  breast, colon, or ovarian cancer? No / Unknown         Mammogram Screening: Recommended annual mammography  Pertinent mammograms are reviewed under the imaging tab.    History of abnormal Pap smear: NO - age 30- 65 PAP every 3 years recommended      Latest Ref Rng & Units 2018     3:35 PM 2018     3:22 PM 2005    12:00 AM   PAP / HPV   PAP (Historical)   NIL  NIL    HPV 16 DNA NEG^Negative Negative      HPV 18 DNA NEG^Negative Negative      Other HR HPV NEG^Negative Negative        Reviewed and updated as needed this visit by clinical staff   Tobacco  Allergies  Meds              Reviewed and updated as needed this visit by Provider                 Past Medical History:   Diagnosis Date    Allergic rhinitis due to other allergen       Past Surgical History:   Procedure Laterality Date    NO HISTORY OF SURGERY       OB History    Para Term  AB Living   2 0 0 0 1 0   SAB IAB Ectopic Multiple Live Births   0 1 0 0 1      # Outcome Date GA Lbr Mega/2nd Weight Sex Delivery Anes PTL Lv   2  04     IAB   DEC   1 IAB                Review of Systems   Constitutional:  Negative for chills and fever.   HENT:  Negative for congestion, ear pain, hearing loss and sore throat.    Eyes:  Negative for pain and visual disturbance.   Respiratory:  Negative for cough and shortness of breath.    Cardiovascular:  Negative for chest pain, palpitations and peripheral edema.   Gastrointestinal:  Positive for constipation and heartburn. Negative for abdominal pain, diarrhea, hematochezia and nausea.   Breasts:  Negative for tenderness, breast mass and discharge.   Genitourinary:  Negative for dysuria, frequency, genital sores, hematuria, pelvic pain, urgency, vaginal bleeding and vaginal discharge.   Musculoskeletal:  Negative for arthralgias, joint swelling and myalgias.   Skin:  Negative for rash.   Neurological:  Positive for headaches. Negative for dizziness, weakness and paresthesias.  "  Psychiatric/Behavioral:  Positive for mood changes. The patient is not nervous/anxious.           OBJECTIVE:   /70 (BP Location: Right arm, Patient Position: Sitting, Cuff Size: Adult Large)   Pulse 77   Temp 97.5  F (36.4  C) (Tympanic)   Resp 18   Ht 1.613 m (5' 3.5\")   Wt 84.6 kg (186 lb 9.6 oz)   LMP 09/25/2023 (Exact Date)   SpO2 98%   BMI 32.54 kg/m    Physical Exam  GENERAL APPEARANCE: healthy, alert and no distress  EYES: Eyes grossly normal to inspection, PERRL and conjunctivae and sclerae normal  HENT: ear canals and TM's normal, nose and mouth without ulcers or lesions, oropharynx clear and oral mucous membranes moist  NECK: no adenopathy, no asymmetry, masses, or scars and thyroid normal to palpation  RESP: lungs clear to auscultation - no rales, rhonchi or wheezes  BREAST: normal without masses, tenderness or nipple discharge and no palpable axillary masses or adenopathy  CV: regular rate and rhythm, normal S1 S2, no S3 or S4, no murmur, click or rub, no peripheral edema and peripheral pulses strong  ABDOMEN: soft, nontender, no hepatosplenomegaly, no masses and bowel sounds normal   (female): normal female external genitalia, normal urethral meatus, vaginal mucosal atrophy noted, normal cervix, adnexae, and uterus without masses or abnormal discharge  MS: no musculoskeletal defects are noted and gait is age appropriate without ataxia  SKIN: no suspicious lesions or rashes  NEURO: Normal strength and tone, sensory exam grossly normal, mentation intact and speech normal  PSYCH: mentation appears normal and affect normal/bright        ASSESSMENT/PLAN:   (Z00.00) Routine general medical examination at a health care facility  (primary encounter diagnosis)  Comment:   Plan: REVIEW OF HEALTH MAINTENANCE PROTOCOL ORDERS,         TSH with free T4 reflex, Comprehensive         metabolic panel (BMP + Alb, Alk Phos, ALT, AST,        Total. Bili, TP), CBC with platelets      (Z12.4) Cervical " cancer screening  Comment:   Plan: Pap Screen with HPV - recommended age 30 - 65         years            (F41.1) Generalized anxiety disorder  Comment:  Controlled no change in treatment plan   Plan: DULoxetine (CYMBALTA) 60 MG capsule,         OFFICE/OUTPT VISIT,EST,LEVL III      (M72.2) Plantar fasciitis  Comment: Symptoms also representative of Planter fasciitis  Plan: Orthotics and Prosthetics DME Orthotic; Foot         Orthotics; Bilateral, OFFICE/OUTPT         VISIT,EST,LEVL III      (E66.09,  Z68.32) Class 1 obesity due to excess calories without serious comorbidity with body mass index (BMI) of 32.0 to 32.9 in adult  Comment: We will attempt weight loss with Wegovy  Plan: Semaglutide-Weight Management (WEGOVY) 0.25         MG/0.5ML pen, OFFICE/OUTPT VISIT,EST,LEVL III            Patient has been advised of split billing requirements and indicates understanding: Yes      COUNSELING:  Reviewed preventive health counseling, as reflected in patient instructions       Regular exercise       Healthy diet/nutrition       Vision screening       Hearing screening       MMR vaccine reminder (1 dose) for patients born after 1956 with no documented vaccination/immunity        Contraception       Family planning       Folic Acid       Colorectal Cancer Screening       Consider Hep C screening for all patients one time for ages 18-79 years       HIV screeninx in teen years, 1x in adult years, and at intervals if high risk       (Haylie)menopause management        She reports that she has quit smoking. Her smoking use included other. She uses smokeless tobacco.          ANIBAL Hancock CNP  [facility]  DME (Durable Medical Equipment) Orders and Documentation  Orders Placed This Encounter   Procedures    Orthotics and Prosthetics DME Orthotic; Foot Orthotics; Bilateral        The patient was assessed and it was determined the patient is in need of the following listed DME  Supplies/Equipment. Please complete supporting documentation below to demonstrate medical necessity.

## 2023-09-27 LAB
BKR LAB AP GYN ADEQUACY: NORMAL
BKR LAB AP GYN INTERPRETATION: NORMAL
BKR LAB AP HPV REFLEX: NORMAL
BKR LAB AP PREVIOUS ABNORMAL: NORMAL
PATH REPORT.COMMENTS IMP SPEC: NORMAL
PATH REPORT.COMMENTS IMP SPEC: NORMAL
PATH REPORT.RELEVANT HX SPEC: NORMAL

## 2023-09-28 ENCOUNTER — MYC MEDICAL ADVICE (OUTPATIENT)
Dept: FAMILY MEDICINE | Facility: CLINIC | Age: 38
End: 2023-09-28
Payer: COMMERCIAL

## 2023-09-28 LAB
HUMAN PAPILLOMA VIRUS 16 DNA: NEGATIVE
HUMAN PAPILLOMA VIRUS 18 DNA: NEGATIVE
HUMAN PAPILLOMA VIRUS FINAL DIAGNOSIS: NORMAL
HUMAN PAPILLOMA VIRUS OTHER HR: NEGATIVE

## 2023-12-22 ENCOUNTER — NURSE TRIAGE (OUTPATIENT)
Dept: FAMILY MEDICINE | Facility: CLINIC | Age: 38
End: 2023-12-22
Payer: COMMERCIAL

## 2023-12-22 ENCOUNTER — TELEPHONE (OUTPATIENT)
Dept: SCHEDULING | Facility: CLINIC | Age: 38
End: 2023-12-22
Payer: COMMERCIAL

## 2023-12-22 ENCOUNTER — OFFICE VISIT (OUTPATIENT)
Dept: URGENT CARE | Facility: URGENT CARE | Age: 38
End: 2023-12-22
Payer: COMMERCIAL

## 2023-12-22 VITALS
RESPIRATION RATE: 16 BRPM | HEART RATE: 75 BPM | SYSTOLIC BLOOD PRESSURE: 111 MMHG | TEMPERATURE: 98.2 F | WEIGHT: 191 LBS | DIASTOLIC BLOOD PRESSURE: 75 MMHG | BODY MASS INDEX: 33.3 KG/M2 | OXYGEN SATURATION: 100 %

## 2023-12-22 DIAGNOSIS — N89.8 VAGINAL IRRITATION: Primary | ICD-10-CM

## 2023-12-22 LAB
ALBUMIN UR-MCNC: NEGATIVE MG/DL
APPEARANCE UR: CLEAR
BACTERIA #/AREA URNS HPF: ABNORMAL /HPF
BILIRUB UR QL STRIP: NEGATIVE
CLUE CELLS: ABNORMAL
COLOR UR AUTO: YELLOW
GLUCOSE UR STRIP-MCNC: NEGATIVE MG/DL
HGB UR QL STRIP: ABNORMAL
KETONES UR STRIP-MCNC: ABNORMAL MG/DL
LEUKOCYTE ESTERASE UR QL STRIP: NEGATIVE
NITRATE UR QL: NEGATIVE
PH UR STRIP: 5.5 [PH] (ref 5–7)
RBC #/AREA URNS AUTO: ABNORMAL /HPF
SP GR UR STRIP: >=1.03 (ref 1–1.03)
SQUAMOUS #/AREA URNS AUTO: ABNORMAL /LPF
TRICHOMONAS, WET PREP: ABNORMAL
UROBILINOGEN UR STRIP-ACNC: 0.2 E.U./DL
WBC #/AREA URNS AUTO: ABNORMAL /HPF
WBC'S/HIGH POWER FIELD, WET PREP: ABNORMAL
YEAST, WET PREP: ABNORMAL

## 2023-12-22 PROCEDURE — 81001 URINALYSIS AUTO W/SCOPE: CPT | Performed by: FAMILY MEDICINE

## 2023-12-22 PROCEDURE — 87210 SMEAR WET MOUNT SALINE/INK: CPT | Performed by: FAMILY MEDICINE

## 2023-12-22 PROCEDURE — 99213 OFFICE O/P EST LOW 20 MIN: CPT | Performed by: FAMILY MEDICINE

## 2023-12-22 RX ORDER — TRIAMCINOLONE ACETONIDE 1 MG/G
CREAM TOPICAL 2 TIMES DAILY
Qty: 30 G | Refills: 0 | Status: SHIPPED | OUTPATIENT
Start: 2023-12-22 | End: 2024-03-21

## 2023-12-22 NOTE — TELEPHONE ENCOUNTER
Reason for Call:  Appointment Request    Patient requesting this type of appt: Chronic Diease Management/Medication/Follow-Up    Requested provider: Krysta Webster    Reason patient unable to be scheduled: Not within requested timeframe    When does patient want to be seen/preferred time: 3-7 days    Comments: Pt is requesting appointment with PCP in regards to vaginal irritation. Pt states irritation for the last 2 months, over the last week has gotten worse, resulting in redness/sores/excessive discharge.    Could we send this information to you in [employer] [facility] or would you prefer to receive a phone call?:   Patient would prefer a phone call   Okay to leave a detailed message?: Yes at Home number on file [phone] (home)    Call taken on 12/22/2023 at 11:22 AM by Argelia Sparks

## 2023-12-22 NOTE — TELEPHONE ENCOUNTER
"Reason for Disposition   Patient wants to be seen    Additional Information   Negative: Pain or burning with passing urine (urination) is main symptom   Negative: Vaginal discharge is main symptom   Negative: Pubic lice suspected   Negative: STI exposure and prevention, question about   Negative: Patient sounds very sick or weak to the triager   Negative: SEVERE pain (e.g., excruciating)   Negative: Genital area looks infected (e.g., draining sore, spreading redness)   Negative: Rash with painful tiny water blisters    Answer Assessment - Initial Assessment Questions  1. SYMPTOM: \"What's the main symptom you're concerned about?\" (e.g., rash, itching, swelling, dryness)      Vaginal sores, vaginal discharge  2. LOCATION: \"Where is the sores located?\" (e.g., inside/outside, left/right)      Vaginal sores  3. ONSET: \"When did the sores start?\"      Looked last weekend  4. PAIN: \"Is there any pain?\" If Yes, ask: \"How bad is it?\" (Scale: 1-10; mild, moderate, severe)    -  MILD (1-3): Doesn't interfere with normal activities.     -  MODERATE (4-7): Interferes with normal activities (e.g., work or school) or awakens from sleep.      -  SEVERE (8-10): Excruciating pain, unable to do any normal activities.      Severe when urine touches or wiping  5. CAUSE: \"What do you think is causing the symptoms?\"      Thought yeast and treated that  6. OTHER SYMPTOMS: \"Do you have any other symptoms?\" (e.g., fever, vaginal bleeding, pain with urination)      No  7. PREGNANCY: \"Is there any chance you are pregnant?\" \"When was your last menstrual period?\"      No    Protocols used: Vulvar Symptoms-A-[place]    "

## 2023-12-23 NOTE — PATIENT INSTRUCTIONS
See OB-GYN    Cream as directed.      Dove or Oil of Olay    All Free and Clear, Dreft    Avoid Dryer Sheets

## 2023-12-23 NOTE — PROGRESS NOTES
(N89.8) Vaginal irritation  (primary encounter diagnosis)  Comment:     Long-term process.  Wet prep inconclusive although shows some WBCs.  Sounds like some sort of long-term irritation.    Plan: Wet prep - Clinic Collect, UA Macroscopic with         reflex to Microscopic and Culture - Clinic         Collect, UA Microscopic with Reflex to Culture,        triamcinolone (KENALOG) 0.1 % external cream,         Ob/Gyn  Referral    I would like her to have an OB/GYN consultation and she is agreeable with.  I did go over a few things to avoid in terms of shower soap, laundry detergent, fabric softener.              CHIEF COMPLAINT    Vaginal irritation.      HISTORY    This patient has been having vaginal itching and irritation for about 3 months.  She has tried Monistat which was not helpful.    She is not having dysuria or frequency.  No pelvic pain.      REVIEW OF SYSTEMS    No fever.  No respiratory congestion or SOB.  No chest pain.  No abdominal pain.      EXAM  /75   Pulse 75   Temp 98.2  F (36.8  C) (Tympanic)   Resp 16   Wt 86.6 kg (191 lb)   SpO2 100%   BMI 33.30 kg/m        Exam offered but patient declined in favor of consultation.    Results for orders placed or performed in visit on 12/22/23   UA Macroscopic with reflex to Microscopic and Culture - Clinic Collect     Status: Abnormal    Specimen: Urine, Clean Catch   Result Value Ref Range    Color Urine Yellow Colorless, Straw, Light Yellow, Yellow    Appearance Urine Clear Clear    Glucose Urine Negative Negative mg/dL    Bilirubin Urine Negative Negative    Ketones Urine Trace (A) Negative mg/dL    Specific Gravity Urine >=1.030 1.003 - 1.035    Blood Urine Trace (A) Negative    pH Urine 5.5 5.0 - 7.0    Protein Albumin Urine Negative Negative mg/dL    Urobilinogen Urine 0.2 0.2, 1.0 E.U./dL    Nitrite Urine Negative Negative    Leukocyte Esterase Urine Negative Negative   UA Microscopic with Reflex to Culture     Status:  Abnormal   Result Value Ref Range    Bacteria Urine Few (A) None Seen /HPF    RBC Urine 0-2 0-2 /HPF /HPF    WBC Urine 0-5 0-5 /HPF /HPF    Squamous Epithelials Urine Few (A) None Seen /LPF    Narrative    Urine Culture not indicated   Wet prep - Clinic Collect     Status: Abnormal    Specimen: Vagina; Swab   Result Value Ref Range    Trichomonas Absent Absent    Yeast Absent Absent    Clue Cells Absent Absent    WBCs/high power field 2+ (A) None

## 2024-01-10 ENCOUNTER — OFFICE VISIT (OUTPATIENT)
Dept: OBGYN | Facility: CLINIC | Age: 39
End: 2024-01-10
Attending: FAMILY MEDICINE
Payer: COMMERCIAL

## 2024-01-10 VITALS
RESPIRATION RATE: 18 BRPM | BODY MASS INDEX: 32.1 KG/M2 | DIASTOLIC BLOOD PRESSURE: 74 MMHG | SYSTOLIC BLOOD PRESSURE: 113 MMHG | HEIGHT: 64 IN | WEIGHT: 188 LBS | TEMPERATURE: 97.9 F | HEART RATE: 80 BPM

## 2024-01-10 DIAGNOSIS — N89.8 VAGINAL IRRITATION: Primary | ICD-10-CM

## 2024-01-10 LAB
C TRACH DNA SPEC QL PROBE+SIG AMP: NEGATIVE
CLUE CELLS: ABNORMAL
N GONORRHOEA DNA SPEC QL NAA+PROBE: NEGATIVE
TRICHOMONAS, WET PREP: ABNORMAL
WBC'S/HIGH POWER FIELD, WET PREP: ABNORMAL
YEAST, WET PREP: ABNORMAL

## 2024-01-10 PROCEDURE — 99204 OFFICE O/P NEW MOD 45 MIN: CPT | Performed by: OBSTETRICS & GYNECOLOGY

## 2024-01-10 PROCEDURE — 87563 M. GENITALIUM AMP PROBE: CPT | Mod: 90 | Performed by: OBSTETRICS & GYNECOLOGY

## 2024-01-10 PROCEDURE — 87491 CHLMYD TRACH DNA AMP PROBE: CPT | Performed by: OBSTETRICS & GYNECOLOGY

## 2024-01-10 PROCEDURE — 99000 SPECIMEN HANDLING OFFICE-LAB: CPT | Performed by: OBSTETRICS & GYNECOLOGY

## 2024-01-10 PROCEDURE — 87798 DETECT AGENT NOS DNA AMP: CPT | Mod: 90 | Performed by: OBSTETRICS & GYNECOLOGY

## 2024-01-10 PROCEDURE — 87591 N.GONORRHOEAE DNA AMP PROB: CPT | Performed by: OBSTETRICS & GYNECOLOGY

## 2024-01-10 PROCEDURE — 87210 SMEAR WET MOUNT SALINE/INK: CPT | Performed by: OBSTETRICS & GYNECOLOGY

## 2024-01-10 NOTE — NURSING NOTE
"Initial /74 (BP Location: Right arm, Patient Position: Chair, Cuff Size: Adult Regular)   Pulse 80   Temp 97.9  F (36.6  C) (Tympanic)   Resp 18   Ht 1.613 m (5' 3.5\")   Wt 85.3 kg (188 lb)   LMP 01/03/2024   BMI 32.78 kg/m   Estimated body mass index is 32.78 kg/m  as calculated from the following:    Height as of this encounter: 1.613 m (5' 3.5\").    Weight as of this encounter: 85.3 kg (188 lb). .    "

## 2024-01-10 NOTE — PROGRESS NOTES
Gynecology Consult Note      HPI: Omayra Cadena is a 38 year old  who presents for recurrent vaginal discharge.  The patient states that over the last couple months she has had recurrent issues with vaginal itching and irritation, particularly after her menses.  She was wondering if it was related to using tampons and has most recently been using pads.  Has not noticed complete resolution.  Does not have particular concern for STI.  Does not complain of urinary or bowel symptoms.  States that she had sores present after using a tampon.  These have resolved.  Does not report issues with abnormal bleeding.  Denies other concerns.    ROS: 10 pt ROS neg other than HPI    PMH:   Past Medical History:   Diagnosis Date    Allergic rhinitis due to other allergen        PSHx:   Past Surgical History:   Procedure Laterality Date    NO HISTORY OF SURGERY         Medications:  Calcium Saccharate (CALCIUM D-GLUCARATE PO),   DULoxetine (CYMBALTA) 60 MG capsule, Take 1 capsule (60 mg) by mouth daily  fexofenadine (ALLEGRA) 180 MG tablet, Take 180 mg by mouth daily  l-lysine HCl 500 MG TABS tablet, Take 500 mg by mouth daily  MAGNESIUM GLYCINATE PO,   NEW MED, Super B Complex  Omega-3 Fatty Acids (FISH OIL PO),   rizatriptan (MAXALT) 10 MG tablet, Take 1 tablet (10 mg) by mouth at onset of headache for migraine May repeat in 2 hours. Max 3 tablets/24 hours.  Vitamin D-Vitamin K (VITAMIN K2-VITAMIN D3 PO),   Semaglutide-Weight Management (WEGOVY) 0.25 MG/0.5ML pen, Inject 0.25 mg Subcutaneous once a week (Patient not taking: Reported on 1/10/2024)  triamcinolone (KENALOG) 0.1 % external cream, Apply topically 2 times daily (Patient not taking: Reported on 1/10/2024)    No current facility-administered medications on file prior to visit.       Allergies:    No Known Allergies    Social History:   Social History     Socioeconomic History    Marital status:      Spouse name: Not on file    Number of children: Not on  file    Years of education: Not on file    Highest education level: Not on file   Occupational History     Employer: [employer]     Employer: STUDENT     Comment:    Tobacco Use    Smoking status: Former     Types: Other    Smokeless tobacco: Current    Tobacco comments:     e-cig   Vaping Use    Vaping Use: Every day    Substances: Nicotine   Substance and Sexual Activity    Alcohol use: Not Currently     Comment: once every 6 months    Drug use: No    Sexual activity: Yes     Partners: Male     Birth control/protection: Male Surgical   Other Topics Concern     Service No    Blood Transfusions No    Caffeine Concern No    Occupational Exposure No    Hobby Hazards No    Sleep Concern No    Stress Concern Yes    Weight Concern No    Special Diet No    Back Care No    Exercise Yes     Comment: once a week    Bike Helmet No    Seat Belt Yes    Self-Exams No    Parent/sibling w/ CABG, MI or angioplasty before 65F 55M? No   Social History Narrative    ** Merged History Encounter **          Social Determinants of Health     Financial Resource Strain: Low Risk  (9/25/2023)    Financial Resource Strain     Within the past 12 months, have you or your family members you live with been unable to get utilities (heat, electricity) when it was really needed?: No   Food Insecurity: Low Risk  (9/25/2023)    Food Insecurity     Within the past 12 months, did you worry that your food would run out before you got money to buy more?: No     Within the past 12 months, did the food you bought just not last and you didn t have money to get more?: No   Transportation Needs: Low Risk  (9/25/2023)    Transportation Needs     Within the past 12 months, has lack of transportation kept you from medical appointments, getting your medicines, non-medical meetings or appointments, work, or from getting things that you need?: No   Physical Activity: Not on file   Stress: Not on file   Social Connections: Not on file   Interpersonal  "Safety: Low Risk  (2023)    Interpersonal Safety     Do you feel physically and emotionally safe where you currently live?: Yes     Within the past 12 months, have you been hit, slapped, kicked or otherwise physically hurt by someone?: No     Within the past 12 months, have you been humiliated or emotionally abused in other ways by your partner or ex-partner?: No   Housing Stability: Low Risk  (2023)    Housing Stability     Do you have housing? : Yes     Are you worried about losing your housing?: No       Family History:  Family History   Problem Relation Age of Onset    Cerebrovascular Disease Father     Musculoskeletal Disorder Maternal Grandmother         ms    Musculoskeletal Disorder Paternal Grandmother         ms    Cerebrovascular Disease Paternal Grandmother        Physical Exam:   Vitals:    01/10/24 0737   BP: 113/74   BP Location: Right arm   Patient Position: Chair   Cuff Size: Adult Regular   Pulse: 80   Resp: 18   Temp: 97.9  F (36.6  C)   TempSrc: Tympanic   Weight: 85.3 kg (188 lb)   Height: 1.613 m (5' 3.5\")      Gen: lying in bed, NAD  CV: Reg rate, well perfused  Pulm: no increased work of breathing  Abd: non-tender, non-distended, no masses   Pelvis: normal appearing external genitalia, vaginal mucosa, cervix, bimanual exam with normal size and contour of uterus with no adnexal masses  Extremities: non-tender, no erythema; no edema  Psych: normal mood and affect  Neuro: no focal deficits        A&P: Omayra Cadena is a 38 year old  presents with vaginal irritation and recurrent vaginal discharge.  Examination today was largely unremarkable.  No lesions or sores noted.  Scant amount of white to pale yellow discharge noted.  Did collect swabs for wet prep, mycoplasma/Ureaplasma/gonorrhea chlamydia.  She has been wearing pads daily and recommended to patient that she try to avoid this.  Discussed vulvar hygiene in detail.  Discussed avoiding tight/spandex pants, considering " not wearing underwear at all at night, avoiding scented soaps, not shaving, among other ideas.  Discussed trying menstrual cup to see if this improves her symptoms after menses.  Finally, reviewed boric acid suppositories should the swabs today be unremarkable, reviewed that she could use boric acid suppositories for couple of weeks to see if this improves her discharge and ongoing symptoms.  Patient states understanding and agreement with plan of care.  Recommended she return if she is having ongoing symptoms do not respond to the above measures.    I spent a total of 45 minutes reviewing chart, obtaining history, counseling, examining, coordinating care, documenting this encounter    Skye James MD   1/10/2024 8:27 AM

## 2024-01-14 LAB
M GENITALIUM DNA SPEC QL NAA+PROBE: NOT DETECTED
M HOMINIS DNA SPEC QL NAA+PROBE: NOT DETECTED
U PARVUM DNA SPEC QL NAA+PROBE: DETECTED
U UREALYTICUM DNA SPEC QL NAA+PROBE: NOT DETECTED

## 2024-01-15 RX ORDER — DOXYCYCLINE 100 MG/1
100 CAPSULE ORAL 2 TIMES DAILY
Qty: 14 CAPSULE | Refills: 0 | Status: SHIPPED | OUTPATIENT
Start: 2024-01-15 | End: 2024-03-21

## 2024-02-02 ENCOUNTER — VIRTUAL VISIT (OUTPATIENT)
Dept: FAMILY MEDICINE | Facility: CLINIC | Age: 39
End: 2024-02-02
Payer: COMMERCIAL

## 2024-02-02 ENCOUNTER — E-VISIT (OUTPATIENT)
Dept: FAMILY MEDICINE | Facility: CLINIC | Age: 39
End: 2024-02-02
Payer: COMMERCIAL

## 2024-02-02 DIAGNOSIS — E66.811 CLASS 1 OBESITY DUE TO EXCESS CALORIES WITHOUT SERIOUS COMORBIDITY WITH BODY MASS INDEX (BMI) OF 32.0 TO 32.9 IN ADULT: Primary | ICD-10-CM

## 2024-02-02 DIAGNOSIS — E66.09 CLASS 1 OBESITY DUE TO EXCESS CALORIES WITHOUT SERIOUS COMORBIDITY WITH BODY MASS INDEX (BMI) OF 32.0 TO 32.9 IN ADULT: Primary | ICD-10-CM

## 2024-02-02 DIAGNOSIS — F41.1 GENERALIZED ANXIETY DISORDER: ICD-10-CM

## 2024-02-02 DIAGNOSIS — F41.1 GENERALIZED ANXIETY DISORDER: Primary | ICD-10-CM

## 2024-02-02 PROCEDURE — 99214 OFFICE O/P EST MOD 30 MIN: CPT | Mod: 95 | Performed by: NURSE PRACTITIONER

## 2024-02-02 PROCEDURE — 99207 PR NON-BILLABLE SERV PER CHARTING: CPT | Performed by: NURSE PRACTITIONER

## 2024-02-02 RX ORDER — HYDROXYZINE HYDROCHLORIDE 25 MG/1
25-50 TABLET, FILM COATED ORAL EVERY 6 HOURS PRN
Qty: 60 TABLET | Refills: 0 | Status: SHIPPED | OUTPATIENT
Start: 2024-02-02 | End: 2024-03-21

## 2024-02-02 RX ORDER — BUSPIRONE HYDROCHLORIDE 5 MG/1
TABLET ORAL
Qty: 106 TABLET | Refills: 0 | Status: SHIPPED | OUTPATIENT
Start: 2024-02-02 | End: 2024-03-21

## 2024-02-02 RX ORDER — ONDANSETRON 4 MG/1
4 TABLET, ORALLY DISINTEGRATING ORAL EVERY 8 HOURS PRN
Qty: 30 TABLET | Refills: 0 | Status: SHIPPED | OUTPATIENT
Start: 2024-02-02

## 2024-02-02 ASSESSMENT — ANXIETY QUESTIONNAIRES
GAD7 TOTAL SCORE: 10
5. BEING SO RESTLESS THAT IT IS HARD TO SIT STILL: MORE THAN HALF THE DAYS
4. TROUBLE RELAXING: SEVERAL DAYS
1. FEELING NERVOUS, ANXIOUS, OR ON EDGE: SEVERAL DAYS
6. BECOMING EASILY ANNOYED OR IRRITABLE: NEARLY EVERY DAY
7. FEELING AFRAID AS IF SOMETHING AWFUL MIGHT HAPPEN: NOT AT ALL
3. WORRYING TOO MUCH ABOUT DIFFERENT THINGS: SEVERAL DAYS
7. FEELING AFRAID AS IF SOMETHING AWFUL MIGHT HAPPEN: NOT AT ALL
8. IF YOU CHECKED OFF ANY PROBLEMS, HOW DIFFICULT HAVE THESE MADE IT FOR YOU TO DO YOUR WORK, TAKE CARE OF THINGS AT HOME, OR GET ALONG WITH OTHER PEOPLE?: SOMEWHAT DIFFICULT
GAD7 TOTAL SCORE: 10
GAD7 TOTAL SCORE: 10
2. NOT BEING ABLE TO STOP OR CONTROL WORRYING: MORE THAN HALF THE DAYS

## 2024-02-02 ASSESSMENT — PATIENT HEALTH QUESTIONNAIRE - PHQ9
SUM OF ALL RESPONSES TO PHQ QUESTIONS 1-9: 10
SUM OF ALL RESPONSES TO PHQ QUESTIONS 1-9: 10
10. IF YOU CHECKED OFF ANY PROBLEMS, HOW DIFFICULT HAVE THESE PROBLEMS MADE IT FOR YOU TO DO YOUR WORK, TAKE CARE OF THINGS AT HOME, OR GET ALONG WITH OTHER PEOPLE: SOMEWHAT DIFFICULT

## 2024-02-02 NOTE — PATIENT INSTRUCTIONS
Thank you for choosing us for your care. I think an in-clinic visit would be best next steps based on your symptoms. Please schedule a clinic appointment; you won t be charged for this eVisit.      You can schedule an appointment right here in [facility], or call [phone]

## 2024-02-02 NOTE — PROGRESS NOTES
"Omayra is a 38 year old who is being evaluated via a billable video visit.      How would you like to obtain your AVS? MyChart  If the video visit is dropped, the invitation should be resent by: Text to cell phone: [phone]  Will anyone else be joining your video visit? No          Assessment & Plan     Class 1 obesity due to excess calories without serious comorbidity with body mass index (BMI) of 32.0 to 32.9 in adult  Patient is unable to get the starter dose of 0.25 we will start a 0.5 and titrate up to the 1 mg she will have follow-up in 3 months  - Semaglutide-Weight Management (WEGOVY) 0.5 MG/0.5ML pen; Inject 0.5 mg Subcutaneous once a week  - Semaglutide-Weight Management (WEGOVY) 1 MG/0.5ML pen; Inject 1 mg Subcutaneous once a week  - busPIRone (BUSPAR) 5 MG tablet; Take 1 tablet (5 mg) by mouth 2 times daily for 7 days, THEN 2 tablets (10 mg) 2 times daily for 23 days.  - ondansetron (ZOFRAN ODT) 4 MG ODT tab; Take 1 tablet (4 mg) by mouth every 8 hours as needed for nausea    Generalized anxiety disorder  Patient has increased anxiety will start BuSpar and Atarax for sleep at nighttime follow-up in 1 month  - hydrOXYzine HCl (ATARAX) 25 MG tablet; Take 1-2 tablets (25-50 mg) by mouth every 6 hours as needed for anxiety or other (insomina)      BMI  Estimated body mass index is 32.78 kg/m  as calculated from the following:    Height as of 1/10/24: 1.613 m (5' 3.5\").    Weight as of 1/10/24: 85.3 kg (188 lb).   Weight management plan: Discussed healthy diet and exercise guidelines      See Patient Instructions    Subjective   Omayra is a 38 year old, presenting for the following health issues:  No chief complaint on file.        2/2/2024     3:14 PM   Additional Questions   Roomed by Cecy VALENCIA     \[facility] [place] [facility]\""     Depression and Anxiety Follow-Up  How are you doing with your depression since your last visit? Worsened   How are you doing with your anxiety since your last visit?  Worsened   Are you having other " symptoms that might be associated with depression or anxiety? No  Have you had a significant life event? No   Do you have any concerns with your use of alcohol or other drugs? No    Social History     Tobacco Use    Smoking status: Former     Types: Other    Smokeless tobacco: Current    Tobacco comments:     e-cig   Vaping Use    Vaping Use: Every day    Substances: Nicotine   Substance Use Topics    Alcohol use: Not Currently     Comment: once every 6 months    Drug use: No         9/8/2021    10:07 PM 9/25/2023    10:26 AM 2/2/2024    10:05 AM   PHQ   PHQ-9 Total Score 5 5 10   Q9: Thoughts of better off dead/self-harm past 2 weeks Not at all Not at all Not at all         9/8/2021    10:10 PM 9/25/2023    10:26 AM 2/2/2024    10:05 AM   ABDI-7 SCORE   Total Score 2 (minimal anxiety)  10 (moderate anxiety)   Total Score 2 5 10         2/2/2024    10:05 AM   Last PHQ-9   1.  Little interest or pleasure in doing things 1   2.  Feeling down, depressed, or hopeless 2   3.  Trouble falling or staying asleep, or sleeping too much 1   4.  Feeling tired or having little energy 1   5.  Poor appetite or overeating 3   6.  Feeling bad about yourself 2   7.  Trouble concentrating 0   8.  Moving slowly or restless 0   Q9: Thoughts of better off dead/self-harm past 2 weeks 0   PHQ-9 Total Score 10       Suicide Assessment Five-step Evaluation and Treatment (SAFE-T)            Review of Systems  Constitutional, HEENT, cardiovascular, pulmonary, gi and gu systems are negative, except as otherwise noted.      Objective           Vitals:  No vitals were obtained today due to virtual visit.    Physical Exam   GENERAL: alert and no distress  EYES: Eyes grossly normal to inspection.  No discharge or erythema, or obvious scleral/conjunctival abnormalities.  RESP: No audible wheeze, cough, or visible cyanosis.    SKIN: Visible skin clear. No significant rash, abnormal pigmentation or lesions.  NEURO: Cranial nerves grossly intact.   Mentation and speech appropriate for age.  PSYCH: Appropriate affect, tone, and pace of words    No results found for any visits on 02/02/24.      Video-Visit Details    Type of service:  Video Visit     Originating Location (pt. Location): Home    Distant Location (provider location):  On-site  Platform used for Video Visit: Doximzoraida  Signed Electronically by: ANIBAL Hancock CNP

## 2024-02-03 ASSESSMENT — PATIENT HEALTH QUESTIONNAIRE - PHQ9: SUM OF ALL RESPONSES TO PHQ QUESTIONS 1-9: 10

## 2024-02-03 ASSESSMENT — ANXIETY QUESTIONNAIRES: GAD7 TOTAL SCORE: 10

## 2024-02-29 ENCOUNTER — MYC MEDICAL ADVICE (OUTPATIENT)
Dept: FAMILY MEDICINE | Facility: CLINIC | Age: 39
End: 2024-02-29
Payer: COMMERCIAL

## 2024-02-29 DIAGNOSIS — F41.1 GENERALIZED ANXIETY DISORDER: Primary | ICD-10-CM

## 2024-03-01 RX ORDER — BUSPIRONE HYDROCHLORIDE 10 MG/1
10 TABLET ORAL 2 TIMES DAILY
Qty: 180 TABLET | Refills: 0 | Status: SHIPPED | OUTPATIENT
Start: 2024-03-01 | End: 2024-03-21

## 2024-03-01 NOTE — TELEPHONE ENCOUNTER
Krysta-  Please see [employer] message. Buspar 10 mg BID pended for review, auth.  ARNOL Argueta RN

## 2024-03-21 ENCOUNTER — VIRTUAL VISIT (OUTPATIENT)
Dept: FAMILY MEDICINE | Facility: CLINIC | Age: 39
End: 2024-03-21
Attending: NURSE PRACTITIONER
Payer: COMMERCIAL

## 2024-03-21 DIAGNOSIS — F41.1 GENERALIZED ANXIETY DISORDER: ICD-10-CM

## 2024-03-21 DIAGNOSIS — E66.811 CLASS 1 OBESITY DUE TO EXCESS CALORIES WITHOUT SERIOUS COMORBIDITY WITH BODY MASS INDEX (BMI) OF 32.0 TO 32.9 IN ADULT: ICD-10-CM

## 2024-03-21 DIAGNOSIS — E66.09 CLASS 1 OBESITY DUE TO EXCESS CALORIES WITHOUT SERIOUS COMORBIDITY WITH BODY MASS INDEX (BMI) OF 32.0 TO 32.9 IN ADULT: ICD-10-CM

## 2024-03-21 PROCEDURE — 99442 PR PHYSICIAN TELEPHONE EVALUATION 11-20 MIN: CPT | Mod: 93 | Performed by: NURSE PRACTITIONER

## 2024-03-21 RX ORDER — DULOXETIN HYDROCHLORIDE 60 MG/1
60 CAPSULE, DELAYED RELEASE ORAL DAILY
Qty: 90 CAPSULE | Refills: 3 | Status: SHIPPED | OUTPATIENT
Start: 2024-03-21

## 2024-03-21 RX ORDER — HYDROXYZINE HYDROCHLORIDE 25 MG/1
25-50 TABLET, FILM COATED ORAL EVERY 6 HOURS PRN
Qty: 60 TABLET | Refills: 0 | Status: SHIPPED | OUTPATIENT
Start: 2024-03-21 | End: 2024-05-29

## 2024-03-21 RX ORDER — BUSPIRONE HYDROCHLORIDE 10 MG/1
10 TABLET ORAL 2 TIMES DAILY
Qty: 180 TABLET | Refills: 3 | Status: SHIPPED | OUTPATIENT
Start: 2024-03-21

## 2024-03-21 ASSESSMENT — ANXIETY QUESTIONNAIRES
GAD7 TOTAL SCORE: 0
2. NOT BEING ABLE TO STOP OR CONTROL WORRYING: NOT AT ALL
GAD7 TOTAL SCORE: 0
3. WORRYING TOO MUCH ABOUT DIFFERENT THINGS: NOT AT ALL
5. BEING SO RESTLESS THAT IT IS HARD TO SIT STILL: NOT AT ALL
1. FEELING NERVOUS, ANXIOUS, OR ON EDGE: NOT AT ALL
6. BECOMING EASILY ANNOYED OR IRRITABLE: NOT AT ALL
7. FEELING AFRAID AS IF SOMETHING AWFUL MIGHT HAPPEN: NOT AT ALL

## 2024-03-21 ASSESSMENT — PATIENT HEALTH QUESTIONNAIRE - PHQ9
SUM OF ALL RESPONSES TO PHQ QUESTIONS 1-9: 5
5. POOR APPETITE OR OVEREATING: NOT AT ALL

## 2024-03-21 NOTE — PROGRESS NOTES
Omayra is a 38 year old who is being evaluated via a billable telephone visit.    What phone number would you like to be contacted at? [phone]  How would you like to obtain your AVS? Lanny  Originating Location (pt. Location): Home    Distant Location (provider location):  On-site    Assessment & Plan     Class 1 obesity due to excess calories without serious comorbidity with body mass index (BMI) of 32.0 to 32.9 in adult  Will attempt to see if we can get weight loss medications covered through primary care if not patient is aware that we will need to make referral to the weight loss clinic  - Semaglutide-Weight Management (WEGOVY) 1 MG/0.5ML pen; Inject 1 mg Subcutaneous once a week    Generalized anxiety disorder   Controlled no change in treatment plan   - hydrOXYzine HCl (ATARAX) 25 MG tablet; Take 1-2 tablets (25-50 mg) by mouth every 6 hours as needed for anxiety or other (insomina)  - busPIRone (BUSPAR) 10 MG tablet; Take 1 tablet (10 mg) by mouth 2 times daily  - DULoxetine (CYMBALTA) 60 MG capsule; Take 1 capsule (60 mg) by mouth daily    Subjective   Omayra is a 38 year old, presenting for the following health issues:  No chief complaint on file.      3/21/2024     4:35 PM   Additional Questions   Roomed by Cecy VALENCIA     [facility]     Medication Followup of Wegovy  Taking Medication as prescribed: yes  Side Effects:  None  Medication Helping Symptoms:  yes      Anxiety   How are you doing with your anxiety since your last visit? Improved   Are you having other symptoms that might be associated with anxiety? No  Have you had a significant life event? No   Are you feeling depressed? No  Do you have any concerns with your use of alcohol or other drugs? No    Social History     Tobacco Use    Smoking status: Former     Types: Other    Smokeless tobacco: Never    Tobacco comments:     e-cig   Vaping Use    Vaping Use: Every day    Substances: Nicotine   Substance Use Topics    Alcohol use: Not Currently      Comment: once every 6 months    Drug use: No         9/8/2021    10:10 PM 9/25/2023    10:26 AM 2/2/2024    10:05 AM   ABDI-7 SCORE   Total Score 2 (minimal anxiety)  10 (moderate anxiety)   Total Score 2 5 10         9/8/2021    10:07 PM 9/25/2023    10:26 AM 2/2/2024    10:05 AM   PHQ   PHQ-9 Total Score 5 5 10   Q9: Thoughts of better off dead/self-harm past 2 weeks Not at all Not at all Not at all         3/21/2024     4:39 PM   Last PHQ-9   1.  Little interest or pleasure in doing things 2   2.  Feeling down, depressed, or hopeless 0   3.  Trouble falling or staying asleep, or sleeping too much 0   4.  Feeling tired or having little energy 0   5.  Poor appetite or overeating 3   6.  Feeling bad about yourself 0   7.  Trouble concentrating 0   8.  Moving slowly or restless 0   Q9: Thoughts of better off dead/self-harm past 2 weeks 0   PHQ-9 Total Score 5             Review of Systems  Constitutional, HEENT, cardiovascular, pulmonary, gi and gu systems are negative, except as otherwise noted.      Objective           Vitals:  No vitals were obtained today due to virtual visit.    Physical Exam   General: Alert and no distress //Respiratory: No audible wheeze, cough, or shortness of breath // Psychiatric:  Appropriate affect, tone, and pace of words            Phone call duration: 20 minutes  Signed Electronically by: ANIBAL Hancock CNP

## 2024-04-20 ENCOUNTER — TELEPHONE (OUTPATIENT)
Dept: FAMILY MEDICINE | Facility: CLINIC | Age: 39
End: 2024-04-20
Payer: COMMERCIAL

## 2024-04-20 DIAGNOSIS — E66.09 CLASS 1 OBESITY DUE TO EXCESS CALORIES WITHOUT SERIOUS COMORBIDITY WITH BODY MASS INDEX (BMI) OF 32.0 TO 32.9 IN ADULT: Primary | ICD-10-CM

## 2024-04-20 DIAGNOSIS — E66.811 CLASS 1 OBESITY DUE TO EXCESS CALORIES WITHOUT SERIOUS COMORBIDITY WITH BODY MASS INDEX (BMI) OF 32.0 TO 32.9 IN ADULT: Primary | ICD-10-CM

## 2024-04-20 NOTE — TELEPHONE ENCOUNTER
Prior Authorization Retail Medication Request    Medication/Dose: Wegovy 1 mg    Diagnosis and ICD code (if different than what is on RX):    New/renewal/insurance change PA/secondary ins. PA:  Previously Tried and Failed:    Rationale:      Insurance   Primary: PAID/[employer]  Insurance ID:  [id number]  [phone]    Thank You,  Neeta Cope, [facility]

## 2024-05-03 NOTE — TELEPHONE ENCOUNTER
Retail Pharmacy Prior Authorization Team   Phone: [phone]    PRIOR AUTHORIZATION DENIED    Medication: WEGOVY 1 MG/0.5ML SC SOAJ  Insurance Company: [employer] Non-Specialty PA's - Phone [phone] Fax [phone]  Denial Date: 5/3/2024  Denial Reason(s):   ADDITIONAL DRUG IS PROVIDED IF REINITIATION OF TREATMENT IS NEEDED DUE TO MORE   THAN 2 CONSECUTIVE DOSES BEING MISSED. IF PATIENT NEEDS TO REMAIN AT THIS DOSE   FOR MORE THAN THE ALLOWED 8 DOSES  DAYS AN APPEAL OR RECONSIDERTATION   WOULD NEED TO BE INITIATED.          Appeal Information:

## 2024-05-03 NOTE — TELEPHONE ENCOUNTER
Retail Pharmacy Prior Authorization Team   Phone: [phone]    PA DENIED  If the provider would like to appeal we will need a detailed   letter of medical necessity with clinical reasoning to start the process.   Please close the encounter when finished.   Thank you,  Yolette Augustine CPhT    PA Team

## 2024-05-06 ENCOUNTER — TELEPHONE (OUTPATIENT)
Dept: FAMILY MEDICINE | Facility: CLINIC | Age: 39
End: 2024-05-06
Payer: COMMERCIAL

## 2024-05-06 NOTE — TELEPHONE ENCOUNTER
Spoke with patient and notified. Gave her the number to schedule with weight loss clinic [phone]

## 2024-05-06 NOTE — TELEPHONE ENCOUNTER
Notify patient Wegovy is not covered at primary care as discussed in clinic will need to follow-up with the weight loss clinic referral has been placed.    Krysta Webster CNP

## 2024-05-06 NOTE — TELEPHONE ENCOUNTER
General Call    Contacts         Type Contact Phone/Fax    05/06/2024 07:47 AM CDT Phone (Incoming) Omayra Cadena (Self) [phone] (H)     Needing a new appeal for medication - spoke with [employer] and was informed that they need the official appeal sent in, to get the prescription ordered. Can call and ask for appeal department or fax a letter          Reason for Call:  Medication Appeal    What are your questions or concerns:  Needing a new appeal for medication - spoke with [employer] and was informed that they need the official appeal sent in, to get the prescription ordered. Can call and ask for appeal department or fax a letter     Was denied because Omayra is unable to have more than 4 shots in the same mg size per year unless an appeal is made    Appeal for medication - Semaglutide-Weight Management (WEGOVY) 1 MG/0.5ML pen     [employer] - Appeal departments  Phone: [phone]  FAX: [phone]    Would like a message back once the appeal has been sent as an update    Date of last appointment with provider: 3/21/24    Could we send this information to you in [employer] / [employer] or would you prefer to receive a phone call?:   No preference   Okay to leave a detailed message?: Yes at Cell number on file:    Telephone Information:   Mobile [phone]   Mobile [phone]

## 2024-05-09 ENCOUNTER — TELEPHONE (OUTPATIENT)
Dept: FAMILY MEDICINE | Facility: CLINIC | Age: 39
End: 2024-05-09
Payer: COMMERCIAL

## 2024-05-09 ENCOUNTER — MYC MEDICAL ADVICE (OUTPATIENT)
Dept: FAMILY MEDICINE | Facility: CLINIC | Age: 39
End: 2024-05-09
Payer: COMMERCIAL

## 2024-05-09 DIAGNOSIS — E66.09 CLASS 1 OBESITY DUE TO EXCESS CALORIES WITHOUT SERIOUS COMORBIDITY WITH BODY MASS INDEX (BMI) OF 32.0 TO 32.9 IN ADULT: ICD-10-CM

## 2024-05-09 DIAGNOSIS — E66.811 CLASS 1 OBESITY DUE TO EXCESS CALORIES WITHOUT SERIOUS COMORBIDITY WITH BODY MASS INDEX (BMI) OF 32.0 TO 32.9 IN ADULT: ICD-10-CM

## 2024-05-09 NOTE — TELEPHONE ENCOUNTER
Prior Authorization Retail Medication Request    Medication/Dose: Wegovy 1 mg  Diagnosis and ICD code (if different than what is on RX):    New/renewal/insurance change PA/secondary ins. PA:  Previously Tried and Failed:    Rationale:      Insurance   Primary: PAID/[employer]  Insurance ID:  [id number]  [phone]  ([phone])    Ins thinks this should be dose increase      Thank You,  Neeta Cope, [facility], [place]

## 2024-05-15 ENCOUNTER — TELEPHONE (OUTPATIENT)
Dept: FAMILY MEDICINE | Facility: CLINIC | Age: 39
End: 2024-05-15
Payer: COMMERCIAL

## 2024-05-15 NOTE — TELEPHONE ENCOUNTER
The patient called back and states she is waiting for authorization for wegovy.  The patient has PA initiated PA for Wegovy.  The patient has appt for weight management in August. The patient has missed 3 doses. The patient contacted the insurance and now that she has missed 3 doses they will cover. Please resubmit.

## 2024-05-15 NOTE — TELEPHONE ENCOUNTER
The patient called back and states she is waiting for authorization for wegovy.  The patient has PA initiated PA for Wegovy.  The patient has appt for weight management in August. The patient has missed 3 doses. The patient contacted the insurance and now that she has missed 3 doses they will cover. Please resubmit.    Thank you    Solange BENITO RN

## 2024-05-21 ENCOUNTER — MYC MEDICAL ADVICE (OUTPATIENT)
Dept: FAMILY MEDICINE | Facility: CLINIC | Age: 39
End: 2024-05-21
Payer: COMMERCIAL

## 2024-05-21 ENCOUNTER — MYC REFILL (OUTPATIENT)
Dept: FAMILY MEDICINE | Facility: CLINIC | Age: 39
End: 2024-05-21
Payer: COMMERCIAL

## 2024-05-21 DIAGNOSIS — E66.09 CLASS 1 OBESITY DUE TO EXCESS CALORIES WITHOUT SERIOUS COMORBIDITY WITH BODY MASS INDEX (BMI) OF 32.0 TO 32.9 IN ADULT: ICD-10-CM

## 2024-05-21 DIAGNOSIS — E66.811 CLASS 1 OBESITY DUE TO EXCESS CALORIES WITHOUT SERIOUS COMORBIDITY WITH BODY MASS INDEX (BMI) OF 32.0 TO 32.9 IN ADULT: ICD-10-CM

## 2024-05-21 DIAGNOSIS — E66.09 CLASS 1 OBESITY DUE TO EXCESS CALORIES WITHOUT SERIOUS COMORBIDITY WITH BODY MASS INDEX (BMI) OF 32.0 TO 32.9 IN ADULT: Primary | ICD-10-CM

## 2024-05-21 DIAGNOSIS — E66.811 CLASS 1 OBESITY DUE TO EXCESS CALORIES WITHOUT SERIOUS COMORBIDITY WITH BODY MASS INDEX (BMI) OF 32.0 TO 32.9 IN ADULT: Primary | ICD-10-CM

## 2024-05-21 NOTE — TELEPHONE ENCOUNTER
Unable to start new request due to recent denial, next step would be to appeal.  Please provide a letter of medical necessity.

## 2024-05-24 NOTE — TELEPHONE ENCOUNTER
Reviewed with patient weight loss medications dosages only covered for 1 month per per dose do not recommend starting continuing with current medications if we cannot prescribe for greater than 1 month with insurance coverage patient would like to continue with weight loss medication [facility] and out-of-pocket patient is in agreement with this plan prescription has been sent.    Krysta Webster CNP

## 2024-05-29 ENCOUNTER — MYC REFILL (OUTPATIENT)
Dept: FAMILY MEDICINE | Facility: CLINIC | Age: 39
End: 2024-05-29
Payer: COMMERCIAL

## 2024-05-29 DIAGNOSIS — F41.1 GENERALIZED ANXIETY DISORDER: ICD-10-CM

## 2024-05-31 RX ORDER — HYDROXYZINE HYDROCHLORIDE 25 MG/1
25-50 TABLET, FILM COATED ORAL EVERY 6 HOURS PRN
Qty: 60 TABLET | Refills: 0 | Status: SHIPPED | OUTPATIENT
Start: 2024-05-31 | End: 2024-08-12

## 2024-05-31 RX ORDER — DULOXETIN HYDROCHLORIDE 60 MG/1
60 CAPSULE, DELAYED RELEASE ORAL DAILY
Qty: 90 CAPSULE | Refills: 3 | OUTPATIENT
Start: 2024-05-31

## 2024-05-31 RX ORDER — BUSPIRONE HYDROCHLORIDE 10 MG/1
10 TABLET ORAL 2 TIMES DAILY
Qty: 180 TABLET | Refills: 3 | OUTPATIENT
Start: 2024-05-31

## 2024-06-17 ENCOUNTER — MYC REFILL (OUTPATIENT)
Dept: FAMILY MEDICINE | Facility: CLINIC | Age: 39
End: 2024-06-17
Payer: COMMERCIAL

## 2024-06-17 DIAGNOSIS — E66.09 CLASS 1 OBESITY DUE TO EXCESS CALORIES WITHOUT SERIOUS COMORBIDITY WITH BODY MASS INDEX (BMI) OF 32.0 TO 32.9 IN ADULT: ICD-10-CM

## 2024-06-17 DIAGNOSIS — E66.811 CLASS 1 OBESITY DUE TO EXCESS CALORIES WITHOUT SERIOUS COMORBIDITY WITH BODY MASS INDEX (BMI) OF 32.0 TO 32.9 IN ADULT: ICD-10-CM

## 2024-07-17 ENCOUNTER — MYC MEDICAL ADVICE (OUTPATIENT)
Dept: FAMILY MEDICINE | Facility: CLINIC | Age: 39
End: 2024-07-17
Payer: COMMERCIAL

## 2024-07-25 ENCOUNTER — VIRTUAL VISIT (OUTPATIENT)
Dept: FAMILY MEDICINE | Facility: CLINIC | Age: 39
End: 2024-07-25
Payer: COMMERCIAL

## 2024-07-25 DIAGNOSIS — E66.811 CLASS 1 OBESITY DUE TO EXCESS CALORIES WITHOUT SERIOUS COMORBIDITY WITH BODY MASS INDEX (BMI) OF 32.0 TO 32.9 IN ADULT: Primary | ICD-10-CM

## 2024-07-25 DIAGNOSIS — E66.09 CLASS 1 OBESITY DUE TO EXCESS CALORIES WITHOUT SERIOUS COMORBIDITY WITH BODY MASS INDEX (BMI) OF 32.0 TO 32.9 IN ADULT: Primary | ICD-10-CM

## 2024-07-25 PROCEDURE — 99213 OFFICE O/P EST LOW 20 MIN: CPT | Mod: 95 | Performed by: NURSE PRACTITIONER

## 2024-07-25 NOTE — PROGRESS NOTES
"Omayra is a 39 year old who is being evaluated via a billable video visit.    How would you like to obtain your AVS? MyChart  If the video visit is dropped, the invitation should be resent by: Text to cell phone: [phone]  Will anyone else be joining your video visit? No      Assessment & Plan     Class 1 obesity due to excess calories without serious comorbidity with body mass index (BMI) of 32.0 to 32.9 in adult  Patient has greater than 20 pound weight loss tolerating the 1 mg Wegovy will increase up to 1.7 for the next 3 months follow-up in 3 months.  - Semaglutide-Weight Management (WEGOVY) 1.7 MG/0.75ML pen; Inject 1.7 mg subcutaneously once a week  - Semaglutide-Weight Management (WEGOVY) 1.7 MG/0.75ML pen; Inject 1.7 mg subcutaneously once a week  - Lipid panel reflex to direct LDL Fasting; Future  - Basic metabolic panel  (Ca, Cl, CO2, Creat, Gluc, K, Na, BUN); Future          BMI  Estimated body mass index is 32.78 kg/m  as calculated from the following:    Height as of 1/10/24: 1.613 m (5' 3.5\").    Weight as of 1/10/24: 85.3 kg (188 lb).   Weight management plan: Discussed healthy diet and exercise guidelines      See Patient Instructions    Subjective   Omayra is a 39 year old, presenting for the following health issues:  Weight Loss        7/25/2024     3:27 PM   Additional Questions   Roomed by Angelina     History of Present Illness       Reason for visit:  Weight check    She eats 2-3 servings of fruits and vegetables daily.She consumes 1 sweetened beverage(s) daily.She exercises with enough effort to increase her heart rate 30 to 60 minutes per day.  She exercises with enough effort to increase her heart rate 7 days per week.   She is taking medications regularly.     Wt Readings from Last 4 Encounters:   01/10/24 85.3 kg (188 lb)   12/22/23 86.6 kg (191 lb)   09/25/23 84.6 kg (186 lb 9.6 oz)   06/05/23 82.6 kg (182 lb)    167# today            Review of Systems  Constitutional, HEENT, " cardiovascular, pulmonary, gi and gu systems are negative, except as otherwise noted.      Objective           Vitals:  No vitals were obtained today due to virtual visit.    Physical Exam   GENERAL: alert and no distress  EYES: Eyes grossly normal to inspection.  No discharge or erythema, or obvious scleral/conjunctival abnormalities.  RESP: No audible wheeze, cough, or visible cyanosis.    SKIN: Visible skin clear. No significant rash, abnormal pigmentation or lesions.  NEURO: Cranial nerves grossly intact.  Mentation and speech appropriate for age.  PSYCH: Appropriate affect, tone, and pace of words          Video-Visit Details    Type of service:  Video Visit   Originating Location (pt. Location): Home    Distant Location (provider location):  On-site  Platform used for Video Visit: Juan M  Signed Electronically by: ANIBAL Hancock CNP

## 2024-08-01 ENCOUNTER — LAB (OUTPATIENT)
Dept: LAB | Facility: CLINIC | Age: 39
End: 2024-08-01
Payer: COMMERCIAL

## 2024-08-01 DIAGNOSIS — E66.811 CLASS 1 OBESITY DUE TO EXCESS CALORIES WITHOUT SERIOUS COMORBIDITY WITH BODY MASS INDEX (BMI) OF 32.0 TO 32.9 IN ADULT: ICD-10-CM

## 2024-08-01 DIAGNOSIS — E66.09 CLASS 1 OBESITY DUE TO EXCESS CALORIES WITHOUT SERIOUS COMORBIDITY WITH BODY MASS INDEX (BMI) OF 32.0 TO 32.9 IN ADULT: ICD-10-CM

## 2024-08-01 LAB
ANION GAP SERPL CALCULATED.3IONS-SCNC: 10 MMOL/L (ref 7–15)
BUN SERPL-MCNC: 18.2 MG/DL (ref 6–20)
CALCIUM SERPL-MCNC: 9.2 MG/DL (ref 8.8–10.4)
CHLORIDE SERPL-SCNC: 104 MMOL/L (ref 98–107)
CHOLEST SERPL-MCNC: 296 MG/DL
CREAT SERPL-MCNC: 0.92 MG/DL (ref 0.51–0.95)
EGFRCR SERPLBLD CKD-EPI 2021: 81 ML/MIN/1.73M2
FASTING STATUS PATIENT QL REPORTED: YES
FASTING STATUS PATIENT QL REPORTED: YES
GLUCOSE SERPL-MCNC: 86 MG/DL (ref 70–99)
HCO3 SERPL-SCNC: 25 MMOL/L (ref 22–29)
HDLC SERPL-MCNC: 51 MG/DL
LDLC SERPL CALC-MCNC: 222 MG/DL
NONHDLC SERPL-MCNC: 245 MG/DL
POTASSIUM SERPL-SCNC: 4.2 MMOL/L (ref 3.4–5.3)
SODIUM SERPL-SCNC: 139 MMOL/L (ref 135–145)
TRIGL SERPL-MCNC: 115 MG/DL

## 2024-08-01 PROCEDURE — 36415 COLL VENOUS BLD VENIPUNCTURE: CPT

## 2024-08-01 PROCEDURE — 80061 LIPID PANEL: CPT

## 2024-08-01 PROCEDURE — 80048 BASIC METABOLIC PNL TOTAL CA: CPT

## 2024-08-12 ENCOUNTER — MYC REFILL (OUTPATIENT)
Dept: FAMILY MEDICINE | Facility: CLINIC | Age: 39
End: 2024-08-12
Payer: COMMERCIAL

## 2024-08-12 DIAGNOSIS — F41.1 GENERALIZED ANXIETY DISORDER: ICD-10-CM

## 2024-08-12 RX ORDER — HYDROXYZINE HYDROCHLORIDE 25 MG/1
25-50 TABLET, FILM COATED ORAL EVERY 6 HOURS PRN
Qty: 60 TABLET | Refills: 0 | Status: SHIPPED | OUTPATIENT
Start: 2024-08-12

## 2024-08-12 RX ORDER — DULOXETIN HYDROCHLORIDE 60 MG/1
60 CAPSULE, DELAYED RELEASE ORAL DAILY
Qty: 90 CAPSULE | Refills: 3 | OUTPATIENT
Start: 2024-08-12

## 2024-08-12 NOTE — TELEPHONE ENCOUNTER
Prescription approved per [facility] Refill Protocol.  Julie Behrendt RN

## 2024-08-19 DIAGNOSIS — E66.811 CLASS 1 OBESITY DUE TO EXCESS CALORIES WITHOUT SERIOUS COMORBIDITY WITH BODY MASS INDEX (BMI) OF 32.0 TO 32.9 IN ADULT: ICD-10-CM

## 2024-08-19 DIAGNOSIS — E66.09 CLASS 1 OBESITY DUE TO EXCESS CALORIES WITHOUT SERIOUS COMORBIDITY WITH BODY MASS INDEX (BMI) OF 32.0 TO 32.9 IN ADULT: ICD-10-CM

## 2024-08-19 RX ORDER — SEMAGLUTIDE 1.7 MG/.75ML
INJECTION, SOLUTION SUBCUTANEOUS
Qty: 3 ML | Refills: 0 | Status: SHIPPED | OUTPATIENT
Start: 2024-08-19 | End: 2024-09-19

## 2024-08-20 ENCOUNTER — MYC REFILL (OUTPATIENT)
Dept: FAMILY MEDICINE | Facility: CLINIC | Age: 39
End: 2024-08-20
Payer: COMMERCIAL

## 2024-08-20 DIAGNOSIS — E66.811 CLASS 1 OBESITY DUE TO EXCESS CALORIES WITHOUT SERIOUS COMORBIDITY WITH BODY MASS INDEX (BMI) OF 32.0 TO 32.9 IN ADULT: ICD-10-CM

## 2024-08-20 DIAGNOSIS — E66.09 CLASS 1 OBESITY DUE TO EXCESS CALORIES WITHOUT SERIOUS COMORBIDITY WITH BODY MASS INDEX (BMI) OF 32.0 TO 32.9 IN ADULT: ICD-10-CM

## 2024-08-21 RX ORDER — SEMAGLUTIDE 1.7 MG/.75ML
INJECTION, SOLUTION SUBCUTANEOUS
Qty: 3 ML | Refills: 0 | OUTPATIENT
Start: 2024-08-21

## 2024-09-19 ENCOUNTER — MYC REFILL (OUTPATIENT)
Dept: FAMILY MEDICINE | Facility: CLINIC | Age: 39
End: 2024-09-19
Payer: COMMERCIAL

## 2024-09-19 DIAGNOSIS — E66.811 CLASS 1 OBESITY DUE TO EXCESS CALORIES WITHOUT SERIOUS COMORBIDITY WITH BODY MASS INDEX (BMI) OF 32.0 TO 32.9 IN ADULT: ICD-10-CM

## 2024-09-19 DIAGNOSIS — E66.09 CLASS 1 OBESITY DUE TO EXCESS CALORIES WITHOUT SERIOUS COMORBIDITY WITH BODY MASS INDEX (BMI) OF 32.0 TO 32.9 IN ADULT: ICD-10-CM

## 2024-09-19 RX ORDER — SEMAGLUTIDE 1.7 MG/.75ML
1.7 INJECTION, SOLUTION SUBCUTANEOUS
Qty: 3 ML | Refills: 0 | Status: SHIPPED | OUTPATIENT
Start: 2024-09-19

## 2024-10-21 ENCOUNTER — VIRTUAL VISIT (OUTPATIENT)
Dept: FAMILY MEDICINE | Facility: CLINIC | Age: 39
End: 2024-10-21
Attending: NURSE PRACTITIONER
Payer: COMMERCIAL

## 2024-10-21 DIAGNOSIS — R51.9 INTRACTABLE HEADACHE, UNSPECIFIED CHRONICITY PATTERN, UNSPECIFIED HEADACHE TYPE: Primary | ICD-10-CM

## 2024-10-21 DIAGNOSIS — F41.1 GENERALIZED ANXIETY DISORDER: ICD-10-CM

## 2024-10-21 DIAGNOSIS — E66.811 CLASS 1 OBESITY DUE TO EXCESS CALORIES WITHOUT SERIOUS COMORBIDITY WITH BODY MASS INDEX (BMI) OF 32.0 TO 32.9 IN ADULT: ICD-10-CM

## 2024-10-21 DIAGNOSIS — E66.09 CLASS 1 OBESITY DUE TO EXCESS CALORIES WITHOUT SERIOUS COMORBIDITY WITH BODY MASS INDEX (BMI) OF 32.0 TO 32.9 IN ADULT: ICD-10-CM

## 2024-10-21 PROCEDURE — 99214 OFFICE O/P EST MOD 30 MIN: CPT | Mod: 95 | Performed by: NURSE PRACTITIONER

## 2024-10-21 RX ORDER — HYDROXYZINE HYDROCHLORIDE 25 MG/1
25-50 TABLET, FILM COATED ORAL EVERY 6 HOURS PRN
Qty: 60 TABLET | Refills: 0 | Status: SHIPPED | OUTPATIENT
Start: 2024-10-21

## 2024-10-21 RX ORDER — HYDROXYZINE HYDROCHLORIDE 25 MG/1
TABLET, FILM COATED ORAL
Qty: 60 TABLET | Refills: 0 | OUTPATIENT
Start: 2024-10-21

## 2024-10-21 RX ORDER — SEMAGLUTIDE 1.7 MG/.75ML
1.7 INJECTION, SOLUTION SUBCUTANEOUS
Qty: 9 ML | Refills: 0 | Status: SHIPPED | OUTPATIENT
Start: 2024-10-21

## 2024-10-22 ENCOUNTER — TELEPHONE (OUTPATIENT)
Dept: FAMILY MEDICINE | Facility: CLINIC | Age: 39
End: 2024-10-22
Payer: COMMERCIAL

## 2024-10-22 NOTE — TELEPHONE ENCOUNTER
Retail Pharmacy Prior Authorization Team   Phone: [phone]      Prior Authorization Approval    Medication: WEGOVY 1.7 MG/0.75ML SC SOAJ  Authorization Effective Date: 9/22/2024  Authorization Expiration Date: 10/22/2025  Approved Dose/Quantity: 3 ML PER 28 DAYS  Reference #:     Insurance Company: [employer] Non-Specialty PA's - Phone [phone] Fax [phone]  Expected CoPay: $    CoPay Card Available: No    Financial Assistance Needed:   Which Pharmacy is filling the prescription: [place], [place] - [address] [place] [address]  Pharmacy Notified: YES  Patient Notified: **Instructed pharmacy to notify patient when script is ready to /ship.**

## 2024-10-22 NOTE — TELEPHONE ENCOUNTER
Prior Authorization Retail Medication Request    Medication/Dose: Semaglutide-Weight Management (WEGOVY) 1.7 MG/0.75ML pen  Diagnosis and ICD code (if different than what is on RX):     New/renewal/insurance change PA/secondary ins. PA:  Previously Tried and Failed:  Currently on Wegovy and needs new PA  Rationale:  Taking Medication as prescribed: yes  Side Effects:  None  Medication Helping Symptoms:  yes    Insurance   [employer] [8[employer] [[id number]]  Member [id number]  Subscriber [id number]  [id number]  Effective from 1/1/2019  Relationship to SubscriberSpouse  Primary:    Insurance ID:       Pharmacy Information (if different than what is on RX)  Name: [facility]  Phone:  [phone]  Fax:[phone]    Clinic Information  Preferred routing pool for dept communication: p [id number]

## 2024-10-22 NOTE — TELEPHONE ENCOUNTER
Medication Question or Refill        What medication are you calling about (include dose and sig)?: Prior Auth  for medication Roger    Preferred Pharmacy:   [facility], [place] [id number] [place] [id number]  Phone: [phone] Fax: [phone]        Controlled Substance Agreement on file:   CSA -- Patient Level:    CSA: None found at the patient level.       Who prescribed the medication?: Krysta Webster CNP at [facility]     Do you need a refill? Yes    When did you use the medication last? current    Patient offered an appointment? No    Do you have any questions or concerns?  No      Could we send this information to you in [employer] or would you prefer to receive a phone call?:   Patient would prefer a phone call   Okay to leave a detailed message?: Yes at Home number on file [phone] (home)

## 2024-10-22 NOTE — TELEPHONE ENCOUNTER
Retail Pharmacy Prior Authorization Team   Phone: [phone]        PA Initiation    Medication: WEGOVY 1.7 MG/0.75ML SC SOAJ  Insurance Company: [employer] Non-Specialty PA's - Phone [phone] Fax [phone]  Pharmacy Filling the Rx: [place], [place] - [address] [place] [address]  Filling Pharmacy Phone: [phone]  Filling Pharmacy Fax: [phone]  Start Date: 10/22/2024

## 2024-11-09 ENCOUNTER — HEALTH MAINTENANCE LETTER (OUTPATIENT)
Age: 39
End: 2024-11-09

## 2024-12-09 ENCOUNTER — NURSE TRIAGE (OUTPATIENT)
Dept: FAMILY MEDICINE | Facility: CLINIC | Age: 39
End: 2024-12-09
Payer: COMMERCIAL

## 2024-12-09 NOTE — TELEPHONE ENCOUNTER
Recommend patient be seen in urgent care if symptoms worsen should be seen in the emergency room.  Krysta Webster CNP   ) Ordered to

## 2024-12-23 ENCOUNTER — OFFICE VISIT (OUTPATIENT)
Dept: URGENT CARE | Facility: URGENT CARE | Age: 39
End: 2024-12-23
Payer: COMMERCIAL

## 2024-12-23 VITALS
WEIGHT: 158 LBS | RESPIRATION RATE: 16 BRPM | SYSTOLIC BLOOD PRESSURE: 97 MMHG | BODY MASS INDEX: 27.55 KG/M2 | DIASTOLIC BLOOD PRESSURE: 62 MMHG | HEART RATE: 80 BPM | OXYGEN SATURATION: 100 % | TEMPERATURE: 98.5 F

## 2024-12-23 DIAGNOSIS — H66.001 NON-RECURRENT ACUTE SUPPURATIVE OTITIS MEDIA OF RIGHT EAR WITHOUT SPONTANEOUS RUPTURE OF TYMPANIC MEMBRANE: Primary | ICD-10-CM

## 2024-12-23 PROCEDURE — 99213 OFFICE O/P EST LOW 20 MIN: CPT | Performed by: PHYSICIAN ASSISTANT

## 2024-12-23 NOTE — PROGRESS NOTES
Assessment & Plan     Non-recurrent acute suppurative otitis media of right ear without spontaneous rupture of tympanic membrane  Will treat with Augmentin. Continue with supportive care. Return to clinic if symptoms worsen or do not improve; otherwise follow up as needed     - amoxicillin-clavulanate (AUGMENTIN) 875-125 MG tablet; Take 1 tablet by mouth 2 times daily for 7 days.                Return in about 3 days (around 12/26/2024), or if symptoms worsen or fail to improve.          Subjective   Chief Complaint   Patient presents with    Otalgia     X 1 day, Right ear pain, right side popped last night       HPI     Ear problem     Onset of symptoms was 1 day(s) ago.  Course of illness is same.    Severity moderate  Current and Associated symptoms: right ear pain  Treatment measures tried include Tylenol/Ibuprofen.  Predisposing factors include None.                      Objective    BP 97/62   Pulse 80   Temp 98.5  F (36.9  C) (Tympanic)   Resp 16   Wt 71.7 kg (158 lb)   LMP 12/03/2024 (Approximate)   SpO2 100%   BMI 27.55 kg/m    Body mass index is 27.55 kg/m .    Physical Exam  Constitutional:       Appearance: She is well-developed.   HENT:      Head: Normocephalic.      Right Ear: Ear canal normal. A middle ear effusion is present. Tympanic membrane is injected (partially).      Left Ear: Tympanic membrane and ear canal normal.   Eyes:      Conjunctiva/sclera: Conjunctivae normal.   Cardiovascular:      Rate and Rhythm: Normal rate.      Heart sounds: Normal heart sounds.   Pulmonary:      Effort: Pulmonary effort is normal.      Breath sounds: Normal breath sounds.   Skin:     General: Skin is warm and dry.      Findings: No rash.   Psychiatric:         Behavior: Behavior normal.                    Signed Electronically by: Hilda Caldera PA-C

## 2025-01-13 ENCOUNTER — OFFICE VISIT (OUTPATIENT)
Dept: FAMILY MEDICINE | Facility: CLINIC | Age: 40
End: 2025-01-13
Payer: COMMERCIAL

## 2025-01-13 VITALS
WEIGHT: 162 LBS | SYSTOLIC BLOOD PRESSURE: 100 MMHG | OXYGEN SATURATION: 99 % | BODY MASS INDEX: 28.25 KG/M2 | TEMPERATURE: 98 F | HEART RATE: 72 BPM | RESPIRATION RATE: 14 BRPM | DIASTOLIC BLOOD PRESSURE: 69 MMHG

## 2025-01-13 DIAGNOSIS — E66.09 CLASS 1 OBESITY DUE TO EXCESS CALORIES WITHOUT SERIOUS COMORBIDITY WITH BODY MASS INDEX (BMI) OF 32.0 TO 32.9 IN ADULT: ICD-10-CM

## 2025-01-13 DIAGNOSIS — R51.9 INTRACTABLE HEADACHE, UNSPECIFIED CHRONICITY PATTERN, UNSPECIFIED HEADACHE TYPE: Primary | ICD-10-CM

## 2025-01-13 DIAGNOSIS — E66.811 CLASS 1 OBESITY DUE TO EXCESS CALORIES WITHOUT SERIOUS COMORBIDITY WITH BODY MASS INDEX (BMI) OF 32.0 TO 32.9 IN ADULT: ICD-10-CM

## 2025-01-13 PROCEDURE — 99214 OFFICE O/P EST MOD 30 MIN: CPT | Performed by: NURSE PRACTITIONER

## 2025-01-13 RX ORDER — TOPIRAMATE 25 MG/1
TABLET, FILM COATED ORAL
Qty: 53 TABLET | Refills: 0 | Status: SHIPPED | OUTPATIENT
Start: 2025-01-13 | End: 2025-02-12

## 2025-01-13 ASSESSMENT — PAIN SCALES - GENERAL: PAINLEVEL_OUTOF10: NO PAIN (0)

## 2025-01-13 NOTE — PROGRESS NOTES
Assessment & Plan     Intractable headache, unspecified chronicity pattern, unspecified headache type  Patient cannot tolerate amitriptyline we will attempt Topamax for her headache prevention.  - topiramate (TOPAMAX) 25 MG tablet; Take 1 tablet (25 mg) by mouth daily for 7 days, THEN 2 tablets (50 mg) daily for 23 days.    Class 1 obesity due to excess calories without serious comorbidity with body mass index (BMI) of 32.0 to 32.9 in adult  Stable at her weight will increase Wegovy to 2.4 for the next 3 months reviewed with patient follow-up in 3 months recommend not continuing at the 2.4 for greater than 3 months we will see what her weight loss is at this dose.  - Semaglutide-Weight Management (WEGOVY) 2.4 MG/0.75ML pen; Inject 2.4 mg subcutaneously once a week.            See Patient Instructions    Tiara Cutler is a 39 year old, presenting for the following health issues:  Weight Check        1/13/2025     8:23 AM   Additional Questions   Roomed by Marva     History of Present Illness       Reason for visit:  Followup on wegovy, different headache medication    She eats 2-3 servings of fruits and vegetables daily.She consumes 0 sweetened beverage(s) daily.She exercises with enough effort to increase her heart rate 9 or less minutes per day.  She exercises with enough effort to increase her heart rate 3 or less days per week.   She is taking medications regularly.     Wt Readings from Last 4 Encounters:   01/13/25 73.5 kg (162 lb)   12/23/24 71.7 kg (158 lb)   01/10/24 85.3 kg (188 lb)   12/22/23 86.6 kg (191 lb)          Migraine   Since your last clinic visit, how have your headaches changed?  Worsened2  How often are you getting headaches or migraines? Every other day    Are you able to do normal daily activities when you have a migraine? Yes     Had to stop taking amitriptyline due to side effects   Dizziness and low blood pressure         Review of Systems  Constitutional, HEENT, cardiovascular,  pulmonary, gi and gu systems are negative, except as otherwise noted.      Objective    /69   Pulse 72   Temp 98  F (36.7  C) (Tympanic)   Resp 14   Wt 73.5 kg (162 lb)   LMP 12/03/2024 (Approximate)   SpO2 99%   BMI 28.25 kg/m    Body mass index is 28.25 kg/m .  Physical Exam   GENERAL: alert and no distress  EYES: Eyes grossly normal to inspection, PERRL and conjunctivae and sclerae normal  HENT: ear canals and TM's normal, nose and mouth without ulcers or lesions  NECK: no adenopathy, no asymmetry, masses, or scars  RESP: lungs clear to auscultation - no rales, rhonchi or wheezes  CV: regular rate and rhythm, normal S1 S2, no S3 or S4, no murmur, click or rub, no peripheral edema  MS: no gross musculoskeletal defects noted, no edema  SKIN: no suspicious lesions or rashes  NEURO: Normal strength and tone, mentation intact and speech normal  PSYCH: mentation appears normal, affect normal/bright        Signed Electronically by: ANIBAL Hancock CNP

## 2025-01-29 NOTE — PROGRESS NOTES
"Omayra Cadena is a 35 year old female who is being evaluated via a billable video visit.      The patient has been notified of following:     \"This video visit will be conducted via a call between you and your physician/provider. We have found that certain health care needs can be provided without the need for an in-person physical exam.  This service lets us provide the care you need with a video conversation.  If a prescription is necessary we can send it directly to your pharmacy.  If lab work is needed we can place an order for that and you can then stop by our lab to have the test done at a later time.    Video visits are billed at different rates depending on your insurance coverage.  Please reach out to your insurance provider with any questions.    If during the course of the call the physician/provider feels a video visit is not appropriate, you will not be charged for this service.\"    Patient has given verbal consent for Video visit? Yes  How would you like to obtain your AVS? MyChart  If you are dropped from the video visit, the video invite should be resent to: Text to cell phone: [phone]  Will anyone else be joining your video visit? No    Subjective     Omayra Cadena is a 35 year old female who presents today via video visit for the following health issues:    HPI      Chief Complaint   Patient presents with     Medication Problem     is tapering off Topamax and every since 2nd week of taper, has had irritability and mood changes, patient wondering if the taper is causing sx.      TMJ     learned that has TMJ that was causing headaches-has had only 1 migraine in past 4 months, wearing mouth guard         Video Start Time: 3:13 PM        Review of Systems   Constitutional, HEENT, cardiovascular, pulmonary, gi and gu systems are negative, except as otherwise noted.      Objective           Vitals:  No vitals were obtained today due to virtual visit.    Physical Exam     GENERAL: " CT guided lung biopsy completed in IR by Dr AFRICA Townsend and Dr AFRICA Mixon.  Bedrest start time 1015.   "Healthy, alert and no distress  EYES: Eyes grossly normal to inspection.  No discharge or erythema, or obvious scleral/conjunctival abnormalities.  RESP: No audible wheeze, cough, or visible cyanosis.  No visible retractions or increased work of breathing.    SKIN: Visible skin clear. No significant rash, abnormal pigmentation or lesions.  NEURO: Cranial nerves grossly intact.  Mentation and speech appropriate for age.  PSYCH: Mentation appears normal, affect normal/bright, judgement and insight intact, normal speech and appearance well-groomed.      No results found for any visits on 11/10/20.        Assessment & Plan     Generalized anxiety disorder  Uncontrolled will increase Cymbalta to 80 mg and patient can use Atarax until symptoms subside and Cymbalta effective.  - DULoxetine (CYMBALTA) 60 MG capsule; Take 1 capsule (60 mg) by mouth daily Take with the 20 mg for a total of 80 mg  - ALPRAZolam (XANAX) 0.25 MG tablet; Take 1 tablet (0.25 mg) by mouth 2 times daily as needed for anxiety     BMI:   Estimated body mass index is 30.58 kg/m  as calculated from the following:    Height as of 1/15/20: 1.607 m (5' 3.25\").    Weight as of 1/15/20: 78.9 kg (174 lb).            See Patient Instructions    Return in about 1 month (around 12/10/2020) for using a video visit.    ANIBAL Hancock CNP  [facility]      Video-Visit Details    Type of service:  Video Visit    Video End Time: 3:28 pm     Originating Location (pt. Location): Home    Distant Location (provider location):  [facility]     Platform used for Video Visit: Juan M        "

## 2025-02-19 DIAGNOSIS — F41.1 GENERALIZED ANXIETY DISORDER: ICD-10-CM

## 2025-02-19 RX ORDER — HYDROXYZINE HYDROCHLORIDE 25 MG/1
TABLET, FILM COATED ORAL
Qty: 60 TABLET | Refills: 1 | Status: SHIPPED | OUTPATIENT
Start: 2025-02-19

## 2025-04-24 ENCOUNTER — VIRTUAL VISIT (OUTPATIENT)
Dept: FAMILY MEDICINE | Facility: CLINIC | Age: 40
End: 2025-04-24
Attending: NURSE PRACTITIONER
Payer: COMMERCIAL

## 2025-04-24 DIAGNOSIS — F41.1 GENERALIZED ANXIETY DISORDER: ICD-10-CM

## 2025-04-24 DIAGNOSIS — E66.811 CLASS 1 OBESITY DUE TO EXCESS CALORIES WITHOUT SERIOUS COMORBIDITY WITH BODY MASS INDEX (BMI) OF 32.0 TO 32.9 IN ADULT: Primary | ICD-10-CM

## 2025-04-24 DIAGNOSIS — E66.09 CLASS 1 OBESITY DUE TO EXCESS CALORIES WITHOUT SERIOUS COMORBIDITY WITH BODY MASS INDEX (BMI) OF 32.0 TO 32.9 IN ADULT: Primary | ICD-10-CM

## 2025-04-24 PROCEDURE — 98006 SYNCH AUDIO-VIDEO EST MOD 30: CPT | Performed by: NURSE PRACTITIONER

## 2025-04-24 RX ORDER — BUSPIRONE HYDROCHLORIDE 10 MG/1
10 TABLET ORAL 2 TIMES DAILY
Qty: 180 TABLET | Refills: 3 | Status: SHIPPED | OUTPATIENT
Start: 2025-04-24

## 2025-04-24 RX ORDER — GALCANEZUMAB 120 MG/ML
INJECTION, SOLUTION SUBCUTANEOUS
COMMUNITY
Start: 2025-04-15

## 2025-04-24 RX ORDER — DULOXETIN HYDROCHLORIDE 60 MG/1
60 CAPSULE, DELAYED RELEASE ORAL DAILY
Qty: 90 CAPSULE | Refills: 3 | Status: SHIPPED | OUTPATIENT
Start: 2025-04-24

## 2025-04-24 RX ORDER — RIZATRIPTAN BENZOATE 10 MG/1
TABLET, ORALLY DISINTEGRATING ORAL
COMMUNITY
Start: 2025-04-09

## 2025-04-24 NOTE — PROGRESS NOTES
Omayra is a 40 year old who is being evaluated via a billable video visit.    How would you like to obtain your AVS? MyChart  If the video visit is dropped, the invitation should be resent by: Text to cell phone: [phone]  Will anyone else be joining your video visit? No  {If patient encounters technical issues they should call [phone] :[id number]}    {PROVIDER CHARTING PREFERENCE:[id number]}    Subjective   Omayra is a 40 year old, presenting for the following health issues:  Weight Management        4/24/2025     3:23 PM   Additional Questions   Roomed by [facility]     History of Present Illness       Reason for visit:  Wegovy update    She eats 2-3 servings of fruits and vegetables daily.She consumes 1 sweetened beverage(s) daily.She exercises with enough effort to increase her heart rate 20 to 29 minutes per day.  She exercises with enough effort to increase her heart rate 4 days per week.   She is taking medications regularly.      156# today  Wt Readings from Last 4 Encounters:   01/13/25 73.5 kg (162 lb)   12/23/24 71.7 kg (158 lb)   01/10/24 85.3 kg (188 lb)   12/22/23 86.6 kg (191 lb)      157 lb        Review of Systems  Constitutional, HEENT, cardiovascular, pulmonary, gi and gu systems are negative, except as otherwise noted.      Objective           Vitals:  No vitals were obtained today due to virtual visit.    Physical Exam   GENERAL: alert and no distress  EYES: Eyes grossly normal to inspection.  No discharge or erythema, or obvious scleral/conjunctival abnormalities.  RESP: No audible wheeze, cough, or visible cyanosis.    SKIN: Visible skin clear. No significant rash, abnormal pigmentation or lesions.  NEURO: Cranial nerves grossly intact.  Mentation and speech appropriate for age.  PSYCH: Appropriate affect, tone, and pace of words    No results found for any visits on 04/24/25.      Video-Visit Details    Type of service:  Video Visit   Originating Location (pt. Location): Home  {PROVIDER  LOCATION On-site should be selected for visits conducted from your clinic location or adjoining [facility] hospital, academic office, or other nearby [facility] building. Off-site should be selected for all other provider locations, including home:[id number]}  Distant Location (provider location):  On-site  Platform used for Video Visit: Anuel  Signed Electronically by: ANIBAL Hancock CNP  {Email feedback regarding this note to [email or web address]   :[id number]}

## 2025-04-28 ENCOUNTER — PATIENT OUTREACH (OUTPATIENT)
Dept: CARE COORDINATION | Facility: CLINIC | Age: 40
End: 2025-04-28
Payer: COMMERCIAL

## 2025-05-22 NOTE — PROGRESS NOTES
"Patient arrived to T[facility] via [facility] with PACU RN  Assessment complete.  A&O x 4. Patient calls appropriately.  Patient ambulates with STBY assist. Bed alarm applied.   Patient has 1/10 pain. Patient declined medication at this time  Denies N&V. Tolerating regular diet. Diet modified to level 6 patient wears full dentures, dentures not available at this time  Surgical site to L neck, ILIA in place with sanguinous output present. Dressing dry/intact with small amount of drainage present.   + void, - flatus, - BM.  Patient denies SOB.  SCD's applied.  Patient calm and cooperative with staff and POC at this time.  Review plan with of care with patient. Call light and personal belongings within reach. Hourly rounding in place. All needs met at this time.    Education provided regarding medications, POC, call light use, bed alarm use, to call before attempting ambulation, shift goals and schedule. Patient verbalizes understanding at this time.    /89   Pulse 84   Temp 37.1 °C (98.8 °F) (Temporal)   Resp 18   Ht 1.778 m (5' 10\")   Wt (!) 134 kg (294 lb 8.6 oz)   SpO2 93%   BMI 42.26 kg/m²     " Omayra is a 39 year old who is being evaluated via a billable video visit.    How would you like to obtain your AVS? MyChart  If the video visit is dropped, the invitation should be resent by: Text to cell phone: [phone]  Will anyone else be joining your video visit? No      Assessment & Plan     Generalized anxiety disorder   Controlled no change in treatment plan   - hydrOXYzine HCl (ATARAX) 25 MG tablet; Take 1-2 tablets (25-50 mg) by mouth every 6 hours as needed for anxiety or other (insomina).    Class 1 obesity due to excess calories without serious comorbidity with body mass index (BMI) of 32.0 to 32.9 in adult  Patient has had greater than 40 pound weight loss will maintain at 1.7 for the next 3 months and will work on tapering after 3 months.  - Semaglutide-Weight Management (WEGOVY) 1.7 MG/0.75ML pen; Inject 1.7 mg subcutaneously every 7 days.    Intractable headache, unspecified chronicity pattern, unspecified headache type   Controlled no change in treatment plan   - amitriptyline (ELAVIL) 25 MG tablet; Take 1 tablet (25 mg) by mouth at bedtime for 7 days, THEN 2 tablets (50 mg) at bedtime.            See Patient Instructions  Start taper of weygovy in 3 months   Subjective   Omayra is a 39 year old, presenting for the following health issues:  Weight Loss        10/21/2024     1:05 PM   Additional Questions   Roomed by [facility]     History of Present Illness       Reason for visit:  Wegovy 1.5mg checkin   She is taking medications regularly.       Medication Followup of weight  Taking Medication as prescribed: yes  Side Effects:  None  Medication Helping Symptoms:  yes    158# today  Wt Readings from Last 4 Encounters:   01/10/24 85.3 kg (188 lb)   12/22/23 86.6 kg (191 lb)   09/25/23 84.6 kg (186 lb 9.6 oz)   06/05/23 82.6 kg (182 lb)    How many days per week do you miss taking your medication? 0      Review of Systems  Constitutional, HEENT, cardiovascular, pulmonary, gi and gu systems are  negative, except as otherwise noted.      Objective    Vitals - Patient Reported  Weight (Patient Reported): 71.7 kg (158 lb)      Vitals:  No vitals were obtained today due to virtual visit.    Physical Exam   GENERAL: alert and no distress  EYES: Eyes grossly normal to inspection.  No discharge or erythema, or obvious scleral/conjunctival abnormalities.  RESP: No audible wheeze, cough, or visible cyanosis.    SKIN: Visible skin clear. No significant rash, abnormal pigmentation or lesions.  NEURO: Cranial nerves grossly intact.  Mentation and speech appropriate for age.  PSYCH: Appropriate affect, tone, and pace of words          Video-Visit Details    Type of service:  Video Visit   Originating Location (pt. Location): Home    Distant Location (provider location):  On-site  Platform used for Video Visit: Anuel  Signed Electronically by: ANIBAL Hancock CNP     Vital Signs Last 24 Hrs  T(C): 37.6 (04 Oct 2018 14:51), Max: 37.6 (04 Oct 2018 14:51)  T(F): 99.6 (04 Oct 2018 14:51), Max: 99.6 (04 Oct 2018 14:51)  HR: 78 (04 Oct 2018 14:51) (63 - 78)  BP: 111/77 (04 Oct 2018 14:51) (111/77 - 140/84)  BP(mean): --  RR: 20 (04 Oct 2018 14:51) (20 - 30)  SpO2: 99% (04 Oct 2018 14:51) (99% - 100%)    GENERAL: NAD, well-developed  HEAD:  Atraumatic, Normocephalic  EYES: EOMI, PERRLA, conjunctiva and sclera clear  ENT: Pharynx not erythematous  PULMONARY: Clear to auscultation bilaterally; No wheeze  CARDIOVASCULAR: Regular rate and rhythm; No murmurs, rubs, or gallops  ABDOMEN: Soft, Nontender, Nondistended; Bowel sounds present  EXTREMITIES:  2+ Peripheral Pulses, No clubbing, cyanosis, or edema  MUSCULOSKELETAL: No calf tenderness  PSYCH: AAOx3, normal affect  SKIN: warm and dry, No rashes or lesions

## 2025-05-28 ENCOUNTER — PATIENT OUTREACH (OUTPATIENT)
Dept: CARE COORDINATION | Facility: CLINIC | Age: 40
End: 2025-05-28
Payer: COMMERCIAL

## 2025-06-23 ENCOUNTER — MYC REFILL (OUTPATIENT)
Dept: FAMILY MEDICINE | Facility: CLINIC | Age: 40
End: 2025-06-23
Payer: COMMERCIAL

## 2025-06-23 DIAGNOSIS — F41.1 GENERALIZED ANXIETY DISORDER: ICD-10-CM

## 2025-06-26 RX ORDER — HYDROXYZINE HYDROCHLORIDE 25 MG/1
TABLET, FILM COATED ORAL
Qty: 60 TABLET | Refills: 1 | Status: SHIPPED | OUTPATIENT
Start: 2025-06-26

## 2025-07-27 ENCOUNTER — MYC REFILL (OUTPATIENT)
Dept: FAMILY MEDICINE | Facility: CLINIC | Age: 40
End: 2025-07-27
Payer: COMMERCIAL

## 2025-07-27 DIAGNOSIS — E66.811 CLASS 1 OBESITY DUE TO EXCESS CALORIES WITHOUT SERIOUS COMORBIDITY WITH BODY MASS INDEX (BMI) OF 32.0 TO 32.9 IN ADULT: ICD-10-CM

## 2025-07-27 DIAGNOSIS — E66.09 CLASS 1 OBESITY DUE TO EXCESS CALORIES WITHOUT SERIOUS COMORBIDITY WITH BODY MASS INDEX (BMI) OF 32.0 TO 32.9 IN ADULT: ICD-10-CM

## 2025-08-05 ENCOUNTER — VIRTUAL VISIT (OUTPATIENT)
Dept: FAMILY MEDICINE | Facility: CLINIC | Age: 40
End: 2025-08-05
Payer: COMMERCIAL

## 2025-08-05 DIAGNOSIS — E66.811 CLASS 1 OBESITY DUE TO EXCESS CALORIES WITHOUT SERIOUS COMORBIDITY WITH BODY MASS INDEX (BMI) OF 32.0 TO 32.9 IN ADULT: Primary | ICD-10-CM

## 2025-08-05 DIAGNOSIS — E66.09 CLASS 1 OBESITY DUE TO EXCESS CALORIES WITHOUT SERIOUS COMORBIDITY WITH BODY MASS INDEX (BMI) OF 32.0 TO 32.9 IN ADULT: Primary | ICD-10-CM

## 2025-08-05 DIAGNOSIS — F41.1 GENERALIZED ANXIETY DISORDER: ICD-10-CM

## 2025-08-05 PROCEDURE — 98006 SYNCH AUDIO-VIDEO EST MOD 30: CPT | Performed by: NURSE PRACTITIONER

## 2025-08-06 ENCOUNTER — PATIENT OUTREACH (OUTPATIENT)
Dept: CARE COORDINATION | Facility: CLINIC | Age: 40
End: 2025-08-06
Payer: COMMERCIAL

## 2025-08-21 ENCOUNTER — TELEPHONE (OUTPATIENT)
Dept: PHARMACY | Facility: CLINIC | Age: 40
End: 2025-08-21
Payer: COMMERCIAL

## 2025-08-21 DIAGNOSIS — Z13.6 CARDIOVASCULAR SCREENING; LDL GOAL LESS THAN 160: Primary | ICD-10-CM

## 2025-08-27 ENCOUNTER — VIRTUAL VISIT (OUTPATIENT)
Dept: PHARMACY | Facility: CLINIC | Age: 40
End: 2025-08-27
Attending: NURSE PRACTITIONER
Payer: COMMERCIAL

## 2025-08-27 VITALS — BODY MASS INDEX: 27.64 KG/M2 | WEIGHT: 156 LBS | HEIGHT: 63 IN

## 2025-08-27 DIAGNOSIS — E66.811 CLASS 1 OBESITY DUE TO EXCESS CALORIES WITHOUT SERIOUS COMORBIDITY WITH BODY MASS INDEX (BMI) OF 32.0 TO 32.9 IN ADULT: ICD-10-CM

## 2025-08-27 DIAGNOSIS — E66.09 CLASS 1 OBESITY DUE TO EXCESS CALORIES WITHOUT SERIOUS COMORBIDITY WITH BODY MASS INDEX (BMI) OF 32.0 TO 32.9 IN ADULT: ICD-10-CM

## 2025-08-27 ASSESSMENT — PAIN SCALES - GENERAL: PAINLEVEL_OUTOF10: NO PAIN (0)
